# Patient Record
Sex: MALE | Race: WHITE | Employment: FULL TIME | URBAN - METROPOLITAN AREA
[De-identification: names, ages, dates, MRNs, and addresses within clinical notes are randomized per-mention and may not be internally consistent; named-entity substitution may affect disease eponyms.]

---

## 2018-10-09 ENCOUNTER — EVALUATION (OUTPATIENT)
Dept: PHYSICAL THERAPY | Age: 28
End: 2018-10-09
Payer: COMMERCIAL

## 2018-10-09 DIAGNOSIS — M54.50 ACUTE BILATERAL LOW BACK PAIN WITHOUT SCIATICA: Primary | ICD-10-CM

## 2018-10-09 PROCEDURE — G8991 OTHER PT/OT GOAL STATUS: HCPCS | Performed by: PHYSICAL THERAPIST

## 2018-10-09 PROCEDURE — G8990 OTHER PT/OT CURRENT STATUS: HCPCS | Performed by: PHYSICAL THERAPIST

## 2018-10-09 PROCEDURE — 97110 THERAPEUTIC EXERCISES: CPT | Performed by: PHYSICAL THERAPIST

## 2018-10-09 PROCEDURE — 97161 PT EVAL LOW COMPLEX 20 MIN: CPT | Performed by: PHYSICAL THERAPIST

## 2018-10-09 NOTE — LETTER
2018    Roverto Hooper MD  [facility]    Patient: Rea Medrano   YOB: 1990   Date of Visit: 10/9/2018     Encounter Diagnosis     ICD-10-CM    1  Acute bilateral low back pain without sciatica M54 5        Dear Dr Cynthia Wen:    Please review the attached Plan of Care from Eric 7 recent visit  Please verify that you agree therapy should continue by signing the attached document and sending it back to our office  If you have any questions or concerns, please don't hesitate to call  Sincerely,    Latoya Mckeon PT      Referring Provider:      I certify that I have read the below Plan of Care and certify the need for these services furnished under this plan of treatment while under my care  Roverto Hooper MD  [address]: [phone]          PT Evaluation     Today's date: 10/9/2018  Patient name: Rea Medrano  : 1990  MRN: [id number]  Referring provider: Ty Garrison MD  Dx:   Encounter Diagnosis     ICD-10-CM    1  Acute bilateral low back pain without sciatica M54 5                   Assessment  Impairments: activity intolerance and pain with function    Symptom irritability: low  Assessment details: 24year old male patient reports to PT with acute low back pain  Patient primary impairment seems to be decreased motor coordination of core musculature as patient had positive prone instability test   Patient has no distal symptoms, and has full lumbar ROM, with slightly painful lumbar extension  Patient will benefit from skilled PT services to address current impairments and functional limitations to help patient return to his PLOF  Understanding of Dx/Px/POC: good   Prognosis: good    Goals  STG  1  Patient will be independent with completion of HEP throughout therapy  2   Patient will sit for at least 30 minutes without increase in symptoms in 3 weeks  LTG  1  Patient will drive for prolonged distances without increase in symptoms in 6 weeks  2  Patient will lift without increase in symptoms in 6 weeks  Plan  Patient would benefit from: skilled physical therapy  Planned therapy interventions: abdominal trunk stabilization, activity modification, joint mobilization, manual therapy, neuromuscular re-education, patient education, strengthening, stretching, therapeutic activities, therapeutic exercise and home exercise program  Frequency: 2x week  Duration in weeks: 4  Treatment plan discussed with: patient        Subjective Evaluation    History of Present Illness  Mechanism of injury: Patient reports with acute intermittent low back pain that began about 3 months ago when patient picked up something heavy  Patient denies numbness/tingling, saddle anesthesia, progressive weakness, or bowel/bladder issues  Patient has some trouble sitting for prolonged periods, and lifting  Pain  Current pain ratin  At best pain ratin  At worst pain ratin  Quality: dull ache  Relieving factors: change in position  Aggravating factors: lifting and sitting    Treatments  Current treatment: physical therapy  Patient Goals  Patient goals for therapy: return to sport/leisure activities and decreased pain          Objective     Special Questions  Positive for disturbed sleep  Negative for night pain, bladder dysfunction, bowel dysfunction and saddle (S4) numbness    Active Range of Motion     Lumbar   Flexion: WFL  Extension: WFL and with pain    Tests     Lumbar   Positive prone instability   Left Hip   Modified Surendra: Positive  Right Hip   Modified Surendra: Positive         Flowsheet Rows      Most Recent Value   PT/OT G-Codes   Current Score  59   Projected Score  75   FOTO information reviewed  Yes   Assessment Type  Evaluation   G code set  Other PT/OT Primary   Other PT Primary Current Status ()  CK   Other PT Primary Goal Status ()  CJ          Precautions: none    Daily Treatment Diary     Manual                                                                                   Exercise Diary              Slouch overcorrect             bridges             Side planks             Supine hip flexor stretch                          Core strengthening                                                                                                                                                                                                       Modalities

## 2018-10-16 ENCOUNTER — OFFICE VISIT (OUTPATIENT)
Dept: PHYSICAL THERAPY | Age: 28
End: 2018-10-16
Payer: COMMERCIAL

## 2018-10-16 DIAGNOSIS — M54.50 ACUTE BILATERAL LOW BACK PAIN WITHOUT SCIATICA: Primary | ICD-10-CM

## 2018-10-16 NOTE — PROGRESS NOTES
PT Discharge Note     Today's date: 10/16/2018  Patient name: David Rogel  : 1990  MRN: [id number]  Referring provider: Gustavo Aguilar MD  Dx:   Encounter Diagnosis     ICD-10-CM    1  Acute bilateral low back pain without sciatica M54 5                   Subjective: Patient notes no problem with his back and did heavy lifting without any issues  Objective: See treatment diary below    Precautions: none     Daily Treatment Diary      Manual                                                                                                                                                      Exercise Diary   10/16                     Slouch overcorrect                       bridges  3x10                     Side planks  5x5" holds                     Supine hip flexor stretch                                               Core strengthening                                                                                                                                                                                                                                                                                                                                                                             Modalities                                                                                                      Assessment: Tolerated treatment well  Patient has met all functional goals and shows ability to self manage outside of PT  Patient educated to continue to have good body mechanics and keep up with HEP to help prevent symptoms from recurring  Goals  STG  1  Patient will be independent with completion of HEP throughout therapy - MET  2  Patient will sit for at least 30 minutes without increase in symptoms in 3 weeks  - MET  LTG  1  Patient will drive for prolonged distances without increase in symptoms in 6 weeks  - MET  2  Patient will lift without increase in symptoms in 6 weeks   - MET    Plan: Discharge

## 2020-04-02 NOTE — PROGRESS NOTES
PT Evaluation     Today's date: 10/9/2018  Patient name: Dion Wells  : 1990  MRN: [id number]  Referring provider: Minda Rod MD  Dx:   Encounter Diagnosis     ICD-10-CM    1  Acute bilateral low back pain without sciatica M54 5                   Assessment  Impairments: activity intolerance and pain with function    Symptom irritability: low  Assessment details: 24year old male patient reports to PT with acute low back pain  Patient primary impairment seems to be decreased motor coordination of core musculature as patient had positive prone instability test   Patient has no distal symptoms, and has full lumbar ROM, with slightly painful lumbar extension  Patient will benefit from skilled PT services to address current impairments and functional limitations to help patient return to his PLOF  Understanding of Dx/Px/POC: good   Prognosis: good    Goals  STG  1  Patient will be independent with completion of HEP throughout therapy  2  Patient will sit for at least 30 minutes without increase in symptoms in 3 weeks  LTG  1  Patient will drive for prolonged distances without increase in symptoms in 6 weeks  2  Patient will lift without increase in symptoms in 6 weeks  Plan  Patient would benefit from: skilled physical therapy  Planned therapy interventions: abdominal trunk stabilization, activity modification, joint mobilization, manual therapy, neuromuscular re-education, patient education, strengthening, stretching, therapeutic activities, therapeutic exercise and home exercise program  Frequency: 2x week  Duration in weeks: 4  Treatment plan discussed with: patient        Subjective Evaluation    History of Present Illness  Mechanism of injury: Patient reports with acute intermittent low back pain that began about 3 months ago when patient picked up something heavy  Patient denies numbness/tingling, saddle anesthesia, progressive weakness, or bowel/bladder issues    Patient has some trouble sitting for prolonged periods, and lifting  Pain  Current pain ratin  At best pain ratin  At worst pain ratin  Quality: dull ache  Relieving factors: change in position  Aggravating factors: lifting and sitting    Treatments  Current treatment: physical therapy  Patient Goals  Patient goals for therapy: return to sport/leisure activities and decreased pain          Objective     Special Questions  Positive for disturbed sleep  Negative for night pain, bladder dysfunction, bowel dysfunction and saddle (S4) numbness    Active Range of Motion     Lumbar   Flexion: WFL  Extension: WFL and with pain    Tests     Lumbar   Positive prone instability   Left Hip   Modified Surendra: Positive  Right Hip   Modified Surendra: Positive         Flowsheet Rows      Most Recent Value   PT/OT G-Codes   Current Score  59   Projected Score  75   FOTO information reviewed  Yes   Assessment Type  Evaluation   G code set  Other PT/OT Primary   Other PT Primary Current Status ()  CK   Other PT Primary Goal Status ()  CJ          Precautions: none    Daily Treatment Diary     Manual                                                                                   Exercise Diary              Slouch overcorrect             bridges             Side planks             Supine hip flexor stretch                          Core strengthening                                                                                                                                                                                                       Modalities No

## 2020-05-16 ENCOUNTER — HOSPITAL ENCOUNTER (EMERGENCY)
Facility: HOSPITAL | Age: 30
Discharge: HOME | End: 2020-05-16
Attending: EMERGENCY MEDICINE | Admitting: EMERGENCY MEDICINE
Payer: COMMERCIAL

## 2020-05-16 VITALS
RESPIRATION RATE: 16 BRPM | TEMPERATURE: 98.1 F | HEART RATE: 85 BPM | OXYGEN SATURATION: 99 % | DIASTOLIC BLOOD PRESSURE: 72 MMHG | WEIGHT: 137.4 LBS | SYSTOLIC BLOOD PRESSURE: 108 MMHG

## 2020-05-16 DIAGNOSIS — S61.219A FINGER LACERATION: Primary | ICD-10-CM

## 2020-05-16 PROCEDURE — 90715 TDAP VACCINE 7 YRS/> IM: CPT | Performed by: EMERGENCY MEDICINE

## 2020-05-16 PROCEDURE — 99282 EMERGENCY DEPT VISIT SF MDM: CPT | Performed by: EMERGENCY MEDICINE

## 2020-05-16 PROCEDURE — 99282 EMERGENCY DEPT VISIT SF MDM: CPT

## 2020-05-16 PROCEDURE — 90471 IMMUNIZATION ADMIN: CPT

## 2020-05-16 RX ADMIN — TETANUS TOXOID, REDUCED DIPHTHERIA TOXOID AND ACELLULAR PERTUSSIS VACCINE, ADSORBED 0.5 ML: 5; 2.5; 8; 8; 2.5 SUSPENSION INTRAMUSCULAR at 17:56

## 2021-06-17 ENCOUNTER — HOSPITAL ENCOUNTER (EMERGENCY)
Facility: HOSPITAL | Age: 31
Discharge: HOME | End: 2021-06-17
Attending: EMERGENCY MEDICINE | Admitting: EMERGENCY MEDICINE
Payer: COMMERCIAL

## 2021-06-17 ENCOUNTER — APPOINTMENT (EMERGENCY)
Dept: RADIOLOGY | Facility: HOSPITAL | Age: 31
End: 2021-06-17
Payer: COMMERCIAL

## 2021-06-17 VITALS
WEIGHT: 140 LBS | RESPIRATION RATE: 18 BRPM | DIASTOLIC BLOOD PRESSURE: 75 MMHG | HEART RATE: 53 BPM | OXYGEN SATURATION: 100 % | SYSTOLIC BLOOD PRESSURE: 129 MMHG | TEMPERATURE: 97.2 F

## 2021-06-17 DIAGNOSIS — N13.30 HYDRONEPHROSIS OF LEFT KIDNEY: Primary | ICD-10-CM

## 2021-06-17 DIAGNOSIS — N20.1 URETEROLITHIASIS: ICD-10-CM

## 2021-06-17 LAB
ALBUMIN SERPL BCP-MCNC: 4.8 G/DL (ref 3.5–5)
ALP SERPL-CCNC: 58 U/L (ref 46–116)
ALT SERPL W P-5'-P-CCNC: 28 U/L (ref 12–78)
ANION GAP SERPL CALCULATED.3IONS-SCNC: 10 MMOL/L (ref 4–13)
AST SERPL W P-5'-P-CCNC: 14 U/L (ref 5–45)
BACTERIA UR QL AUTO: ABNORMAL /HPF
BASOPHILS # BLD AUTO: 0.03 THOUSANDS/ΜL (ref 0–0.1)
BASOPHILS NFR BLD AUTO: 0 % (ref 0–1)
BILIRUB SERPL-MCNC: 0.74 MG/DL (ref 0.2–1)
BILIRUB UR QL STRIP: NEGATIVE
BUN SERPL-MCNC: 31 MG/DL (ref 5–25)
CALCIUM SERPL-MCNC: 9.7 MG/DL (ref 8.3–10.1)
CHLORIDE SERPL-SCNC: 109 MMOL/L (ref 100–108)
CLARITY UR: ABNORMAL
CO2 SERPL-SCNC: 25 MMOL/L (ref 21–32)
COARSE GRAN CASTS URNS QL MICRO: ABNORMAL /LPF
COLOR UR: ABNORMAL
CREAT SERPL-MCNC: 1.11 MG/DL (ref 0.6–1.3)
EOSINOPHIL # BLD AUTO: 0 THOUSAND/ΜL (ref 0–0.61)
EOSINOPHIL NFR BLD AUTO: 0 % (ref 0–6)
ERYTHROCYTE [DISTWIDTH] IN BLOOD BY AUTOMATED COUNT: 12.6 % (ref 11.6–15.1)
GFR SERPL CREATININE-BSD FRML MDRD: 90 ML/MIN/1.73SQ M
GLUCOSE SERPL-MCNC: 115 MG/DL (ref 65–140)
GLUCOSE UR STRIP-MCNC: NEGATIVE MG/DL
HCT VFR BLD AUTO: 42.7 % (ref 36.5–49.3)
HGB BLD-MCNC: 14.8 G/DL (ref 12–17)
HGB UR QL STRIP.AUTO: ABNORMAL
HYALINE CASTS #/AREA URNS LPF: ABNORMAL /LPF
IMM GRANULOCYTES # BLD AUTO: 0.04 THOUSAND/UL (ref 0–0.2)
IMM GRANULOCYTES NFR BLD AUTO: 0 % (ref 0–2)
KETONES UR STRIP-MCNC: ABNORMAL MG/DL
LEUKOCYTE ESTERASE UR QL STRIP: NEGATIVE
LYMPHOCYTES # BLD AUTO: 2.57 THOUSANDS/ΜL (ref 0.6–4.47)
LYMPHOCYTES NFR BLD AUTO: 25 % (ref 14–44)
MCH RBC QN AUTO: 29.2 PG (ref 26.8–34.3)
MCHC RBC AUTO-ENTMCNC: 34.7 G/DL (ref 31.4–37.4)
MCV RBC AUTO: 84 FL (ref 82–98)
MONOCYTES # BLD AUTO: 0.4 THOUSAND/ΜL (ref 0.17–1.22)
MONOCYTES NFR BLD AUTO: 4 % (ref 4–12)
MUCOUS THREADS UR QL AUTO: ABNORMAL
NEUTROPHILS # BLD AUTO: 7.16 THOUSANDS/ΜL (ref 1.85–7.62)
NEUTS SEG NFR BLD AUTO: 71 % (ref 43–75)
NITRITE UR QL STRIP: NEGATIVE
NON-SQ EPI CELLS URNS QL MICRO: ABNORMAL /HPF
NRBC BLD AUTO-RTO: 0 /100 WBCS
PH UR STRIP.AUTO: 8 [PH] (ref 4.5–8)
PLATELET # BLD AUTO: 311 THOUSANDS/UL (ref 149–390)
PMV BLD AUTO: 10.2 FL (ref 8.9–12.7)
POTASSIUM SERPL-SCNC: 3.9 MMOL/L (ref 3.5–5.3)
PROT SERPL-MCNC: 7.3 G/DL (ref 6.4–8.2)
PROT UR STRIP-MCNC: ABNORMAL MG/DL
RBC # BLD AUTO: 5.06 MILLION/UL (ref 3.88–5.62)
RBC #/AREA URNS AUTO: ABNORMAL /HPF
SODIUM SERPL-SCNC: 144 MMOL/L (ref 136–145)
SP GR UR STRIP.AUTO: 1.02 (ref 1–1.03)
UROBILINOGEN UR QL STRIP.AUTO: 1 E.U./DL
WBC # BLD AUTO: 10.2 THOUSAND/UL (ref 4.31–10.16)
WBC #/AREA URNS AUTO: ABNORMAL /HPF

## 2021-06-17 PROCEDURE — 36415 COLL VENOUS BLD VENIPUNCTURE: CPT | Performed by: EMERGENCY MEDICINE

## 2021-06-17 PROCEDURE — 99284 EMERGENCY DEPT VISIT MOD MDM: CPT | Performed by: EMERGENCY MEDICINE

## 2021-06-17 PROCEDURE — 96374 THER/PROPH/DIAG INJ IV PUSH: CPT

## 2021-06-17 PROCEDURE — 80053 COMPREHEN METABOLIC PANEL: CPT | Performed by: EMERGENCY MEDICINE

## 2021-06-17 PROCEDURE — G1004 CDSM NDSC: HCPCS

## 2021-06-17 PROCEDURE — 85025 COMPLETE CBC W/AUTO DIFF WBC: CPT | Performed by: EMERGENCY MEDICINE

## 2021-06-17 PROCEDURE — 74176 CT ABD & PELVIS W/O CONTRAST: CPT

## 2021-06-17 PROCEDURE — 81001 URINALYSIS AUTO W/SCOPE: CPT

## 2021-06-17 PROCEDURE — 96361 HYDRATE IV INFUSION ADD-ON: CPT

## 2021-06-17 PROCEDURE — 96375 TX/PRO/DX INJ NEW DRUG ADDON: CPT

## 2021-06-17 PROCEDURE — 99284 EMERGENCY DEPT VISIT MOD MDM: CPT

## 2021-06-17 RX ORDER — OXYCODONE HYDROCHLORIDE AND ACETAMINOPHEN 5; 325 MG/1; MG/1
1 TABLET ORAL EVERY 4 HOURS PRN
Qty: 6 TABLET | Refills: 0 | Status: SHIPPED | OUTPATIENT
Start: 2021-06-17

## 2021-06-17 RX ORDER — HYDROMORPHONE HCL/PF 1 MG/ML
1 SYRINGE (ML) INJECTION ONCE
Status: DISCONTINUED | OUTPATIENT
Start: 2021-06-17 | End: 2021-06-17 | Stop reason: HOSPADM

## 2021-06-17 RX ORDER — KETOROLAC TROMETHAMINE 30 MG/ML
15 INJECTION, SOLUTION INTRAMUSCULAR; INTRAVENOUS ONCE
Status: COMPLETED | OUTPATIENT
Start: 2021-06-17 | End: 2021-06-17

## 2021-06-17 RX ORDER — ONDANSETRON 4 MG/1
4 TABLET, ORALLY DISINTEGRATING ORAL EVERY 6 HOURS PRN
Qty: 6 TABLET | Refills: 0 | Status: SHIPPED | OUTPATIENT
Start: 2021-06-17

## 2021-06-17 RX ORDER — ONDANSETRON 2 MG/ML
4 INJECTION INTRAMUSCULAR; INTRAVENOUS ONCE
Status: COMPLETED | OUTPATIENT
Start: 2021-06-17 | End: 2021-06-17

## 2021-06-17 RX ADMIN — ONDANSETRON 4 MG: 2 INJECTION INTRAMUSCULAR; INTRAVENOUS at 15:16

## 2021-06-17 RX ADMIN — KETOROLAC TROMETHAMINE 15 MG: 30 INJECTION, SOLUTION INTRAMUSCULAR at 15:16

## 2021-06-17 RX ADMIN — SODIUM CHLORIDE 1000 ML: 0.9 INJECTION, SOLUTION INTRAVENOUS at 15:18

## 2021-06-17 NOTE — ED PROVIDER NOTES
History  Chief Complaint   Patient presents with    Abdominal Pain     LLQ abd  pain with sudden onset     HPI   27-year-old gentleman here with left flank/left lower quadrant pain  He was going to the bathroom this morning when he developed sudden onset stabbing pain that he 1st felt mostly in the left lower quadrant  It initially got better and then 45 minutes prior to arrival became acutely more severe  Pain is in the left flank and radiates to the left lower quadrant  Prior to arrival he was on floor writhing in pain and was diaphoretic  He has felt nauseated and has vomited twice  No history of nephrolithiasis or gastrointestinal problems or abdominal surgeries  He has not been febrile  No urinary symptoms  No diarrhea or constipation  No testicular pain or swelling  None       History reviewed  No pertinent past medical history  History reviewed  No pertinent surgical history  History reviewed  No pertinent family history  I have reviewed and agree with the history as documented  E-Cigarette/Vaping     E-Cigarette/Vaping Substances     Social History     Tobacco Use    Smoking status: Never Smoker    Smokeless tobacco: Never Used   Substance Use Topics    Alcohol use: Yes    Drug use: Never        Review of Systems   Constitutional: Negative for chills and fever  Respiratory: Negative for shortness of breath  Cardiovascular: Negative for chest pain  Gastrointestinal: Positive for abdominal pain, nausea and vomiting  Negative for constipation and diarrhea  Genitourinary: Positive for flank pain  Negative for frequency, hematuria, scrotal swelling and testicular pain  All other systems reviewed and are negative        Physical Exam  ED Triage Vitals [06/17/21 1455]   Temperature Pulse Respirations Blood Pressure SpO2   (!) 97 2 °F (36 2 °C) (!) 53 18 129/75 100 %      Temp Source Heart Rate Source Patient Position - Orthostatic VS BP Location FiO2 (%)   Tympanic -- -- -- -- Pain Score       Worst Possible Pain             Orthostatic Vital Signs  Vitals:    06/17/21 1455   BP: 129/75   Pulse: (!) 53       Physical Exam  Vitals and nursing note reviewed  Constitutional:       General: He is not in acute distress  Appearance: He is well-developed  He is not diaphoretic  Comments: Uncomfortable appearing  Writhing around on stretcher  HENT:      Head: Normocephalic and atraumatic  Eyes:      General: No scleral icterus  Conjunctiva/sclera: Conjunctivae normal       Pupils: Pupils are equal, round, and reactive to light  Cardiovascular:      Rate and Rhythm: Normal rate and regular rhythm  Heart sounds: No murmur heard  No friction rub  No gallop  Pulmonary:      Breath sounds: Normal breath sounds  No wheezing or rales  Abdominal:      General: There is no distension  Palpations: Abdomen is soft  Tenderness: There is no abdominal tenderness  There is no guarding or rebound  Genitourinary:     Comments: Mild tenderness to palpation over left CVA  Musculoskeletal:         General: No tenderness  Normal range of motion  Cervical back: Normal range of motion and neck supple  Skin:     General: Skin is warm and dry  Coloration: Skin is not pale  Findings: No erythema  Neurological:      Mental Status: He is alert and oriented to person, place, and time  Cranial Nerves: No cranial nerve deficit  Sensory: No sensory deficit  Motor: No abnormal muscle tone     Psychiatric:         Behavior: Behavior normal          ED Medications  Medications   ketorolac (TORADOL) injection 15 mg (15 mg Intravenous Given 6/17/21 1516)   ondansetron (ZOFRAN) injection 4 mg (4 mg Intravenous Given 6/17/21 1516)   sodium chloride 0 9 % bolus 1,000 mL (0 mL Intravenous Stopped 6/17/21 1719)       Diagnostic Studies  Results Reviewed     Procedure Component Value Units Date/Time    Urine Microscopic [[id number]]  (Abnormal) Collected: 06/17/21 1528    Lab Status: Final result Specimen: Urine, Clean Catch Updated: 06/17/21 1616     RBC, UA Innumerable /hpf      WBC, UA 2-4 /hpf      Epithelial Cells None Seen /hpf      Bacteria, UA None Seen /hpf      Hyaline Casts, UA 0-3 /lpf      COARSE GRANULAR CASTS 0-3 /lpf      MUCUS THREADS Occasional    Comprehensive metabolic panel [[id number]]  (Abnormal) Collected: 06/17/21 1519    Lab Status: Final result Specimen: Blood from Arm, Left Updated: 06/17/21 1547     Sodium 144 mmol/L      Potassium 3 9 mmol/L      Chloride 109 mmol/L      CO2 25 mmol/L      ANION GAP 10 mmol/L      BUN 31 mg/dL      Creatinine 1 11 mg/dL      Glucose 115 mg/dL      Calcium 9 7 mg/dL      AST 14 U/L      ALT 28 U/L      Alkaline Phosphatase 58 U/L      Total Protein 7 3 g/dL      Albumin 4 8 g/dL      Total Bilirubin 0 74 mg/dL      eGFR 90 ml/min/1 73sq m     Narrative:      National Kidney Disease Foundation guidelines for Chronic Kidney Disease (CKD):     Stage 1 with normal or high GFR (GFR > 90 mL/min/1 73 square meters)    Stage 2 Mild CKD (GFR = 60-89 mL/min/1 73 square meters)    Stage 3A Moderate CKD (GFR = 45-59 mL/min/1 73 square meters)    Stage 3B Moderate CKD (GFR = 30-44 mL/min/1 73 square meters)    Stage 4 Severe CKD (GFR = 15-29 mL/min/1 73 square meters)    Stage 5 End Stage CKD (GFR <15 mL/min/1 73 square meters)  Note: GFR calculation is accurate only with a steady state creatinine    CBC and differential [[id number]]  (Abnormal) Collected: 06/17/21 1519    Lab Status: Final result Specimen: Blood from Arm, Left Updated: 06/17/21 1536     WBC 10 20 Thousand/uL      RBC 5 06 Million/uL      Hemoglobin 14 8 g/dL      Hematocrit 42 7 %      MCV 84 fL      MCH 29 2 pg      MCHC 34 7 g/dL      RDW 12 6 %      MPV 10 2 fL      Platelets 331 Thousands/uL      nRBC 0 /100 WBCs      Neutrophils Relative 71 %      Immat GRANS % 0 %      Lymphocytes Relative 25 %      Monocytes Relative 4 %      Eosinophils Relative 0 %      Basophils Relative 0 %      Neutrophils Absolute 7 16 Thousands/µL      Immature Grans Absolute 0 04 Thousand/uL      Lymphocytes Absolute 2 57 Thousands/µL      Monocytes Absolute 0 40 Thousand/µL      Eosinophils Absolute 0 00 Thousand/µL      Basophils Absolute 0 03 Thousands/µL     Urine Macroscopic, POC [[id number]]  (Abnormal) Collected: 06/17/21 1528    Lab Status: Final result Specimen: Urine Updated: 06/17/21 1529     Color, UA Jessica     Clarity, UA Cloudy     pH, UA 8 0     Leukocytes, UA Negative     Nitrite, UA Negative     Protein, UA 30 (1+) mg/dl      Glucose, UA Negative mg/dl      Ketones, UA >=160 (4+) mg/dl      Urobilinogen, UA 1 0 E U /dl      Bilirubin, UA Negative     Blood, UA Large     Specific Gravity, UA 1 025    Narrative:      CLINITEK RESULT                 CT renal stone study abdomen pelvis wo contrast   Final Result by Funmilayo Kwok DO (06/17 1642)      3 mm left UVJ calculus causing mild hydroureteronephrosis  2 mm left renal calculus        Workstation performed: [id number]               Procedures  Procedures      ED Course  ED Course as of Jun 17 1946   Thu Jun 17, 2021   1532 Ketones, UA(!): >=160 (4+)   1532 Blood, UA(!): Large   1603 eGFR: 90                                       MDM  Number of Diagnoses or Management Options  Hydronephrosis of left kidney: new and requires workup  Ureterolithiasis: new and requires workup     Amount and/or Complexity of Data Reviewed  Clinical lab tests: ordered and reviewed  Tests in the radiology section of CPT®: ordered and reviewed  Tests in the medicine section of CPT®: ordered and reviewed  Decide to obtain previous medical records or to obtain history from someone other than the patient: yes  Review and summarize past medical records: yes  Independent visualization of images, tracings, or specimens: yes    Patient Progress  Patient progress: improved     31year-old here with left flank and left lower quadrant abd pain   On arrival patient uncomfortable and requires IV analgesia  Likely nephrolithiasis given writhing type pain radiating from left flank to left groin area  Given patient has no history of nephrolithiasis plan is CT stone study to evaluate for hydronephrosis or ureterolithiasis  Check renal function and check urinalysis for evidence of hematuria or urinary tract infection  3 mm L UVJ stone  Anticipate spontaneous passage  Pain improved  Will provide Rx for brief course of PRN pain medicine + ibuprofen  Follow-up with urology  Disposition  Final diagnoses:   Hydronephrosis of left kidney   Ureterolithiasis - Left     Time reflects when diagnosis was documented in both MDM as applicable and the Disposition within this note     Time User Action Codes Description Comment    6/17/2021  4:48 PM Stormy Sprain Add [N13 30] Hydronephrosis of left kidney     6/17/2021  4:49 PM Stormy Sprain Add [N20 1] Ureterolithiasis     6/17/2021  4:49 PM Stormy Sprain Modify [N20 1] Ureterolithiasis Left      ED Disposition     ED Disposition Condition Date/Time Comment    Discharge Good Thu Jun 17, 2021  4:48 PM Mir Jones Check discharge to home/self care              Follow-up Information     Follow up With Specialties Details Why Contact Info Additional [address] [facility] Schedule an appointment as soon as possible for a visit   [id number] [facility] [address] [address]  701  [facility], [address], [address] [place] [address], [address] [facility]          Discharge Medication List as of 6/17/2021  4:56 PM      START taking these medications    Details   ondansetron (ZOFRAN-ODT) 4 mg disintegrating tablet Take 1 tablet (4 mg total) by mouth every 6 (six) hours as needed for nausea or vomiting, Starting Thu 6/17/2021, Normal      oxyCODONE-acetaminophen (PERCOCET) 5-325 mg per tablet Take 1 tablet by mouth every 4 (four) hours as needed for moderate painMax Daily Amount: 6 tablets, Starting Thu 6/17/2021, Normal           No discharge procedures on file  PDMP Review     None           ED Provider  Attending physically available and evaluated Rosalinesara Abdiasgloria Sol  I managed the patient along with the ED Attending      Electronically Signed by         Emir Mercado MD  06/17/21 4196

## 2021-06-17 NOTE — ED ATTENDING ATTESTATION
6/17/2021  Yohannes CESAR MD, saw and evaluated the patient  I have discussed the patient with the resident/non-physician practitioner and agree with the resident's/non-physician practitioner's findings, Plan of Care, and MDM as documented in the resident's/non-physician practitioner's note, except where noted  All available labs and Radiology studies were reviewed  I was present for key portions of any procedure(s) performed by the resident/non-physician practitioner and I was immediately available to provide assistance  At this point I agree with the current assessment done in the Emergency Department  I have conducted an independent evaluation of this patient a history and physical is as follows:    ED Course         Critical Care Time  Procedures      32 yo male with left lower abdominal pain started earlier today and then progressed one hour ago  Pt with two episodes of vomiting due to pain  Pt with pressure with urinating, no pain or blood  Pt with no diarrhea, no fever, no back pain  No hx of same  No pmh  Vss, afebrile, lungs cta, rrr, abdomen soft mild left lq tenderness, no cva tenderness  Stone study, pain meds, urine

## 2021-06-18 ENCOUNTER — HOSPITAL ENCOUNTER (EMERGENCY)
Facility: HOSPITAL | Age: 31
Discharge: HOME | End: 2021-06-19
Payer: COMMERCIAL

## 2021-06-18 VITALS
OXYGEN SATURATION: 99 % | HEIGHT: 70 IN | WEIGHT: 134 LBS | BODY MASS INDEX: 19.18 KG/M2 | DIASTOLIC BLOOD PRESSURE: 72 MMHG | RESPIRATION RATE: 18 BRPM | SYSTOLIC BLOOD PRESSURE: 142 MMHG | TEMPERATURE: 99.1 F | HEART RATE: 62 BPM

## 2021-06-18 DIAGNOSIS — N20.1 URETEROLITHIASIS: Primary | ICD-10-CM

## 2021-06-18 DIAGNOSIS — R10.9 LEFT FLANK PAIN: ICD-10-CM

## 2021-06-18 PROCEDURE — 96372 THER/PROPH/DIAG INJ SC/IM: CPT

## 2021-06-18 PROCEDURE — 99284 EMERGENCY DEPT VISIT MOD MDM: CPT | Performed by: EMERGENCY MEDICINE

## 2021-06-18 PROCEDURE — 99284 EMERGENCY DEPT VISIT MOD MDM: CPT

## 2021-06-18 PROCEDURE — 76775 US EXAM ABDO BACK WALL LIM: CPT | Performed by: EMERGENCY MEDICINE

## 2021-06-18 PROCEDURE — 81001 URINALYSIS AUTO W/SCOPE: CPT | Performed by: EMERGENCY MEDICINE

## 2021-06-18 RX ORDER — KETOROLAC TROMETHAMINE 30 MG/ML
15 INJECTION, SOLUTION INTRAMUSCULAR; INTRAVENOUS ONCE
Status: COMPLETED | OUTPATIENT
Start: 2021-06-18 | End: 2021-06-18

## 2021-06-18 RX ADMIN — KETOROLAC TROMETHAMINE 15 MG: 30 INJECTION, SOLUTION INTRAMUSCULAR; INTRAVENOUS at 23:30

## 2021-06-19 LAB
BACTERIA UR QL AUTO: ABNORMAL /HPF
BILIRUB UR QL STRIP: NEGATIVE
CLARITY UR: ABNORMAL
COLOR UR: YELLOW
GLUCOSE UR STRIP-MCNC: NEGATIVE MG/DL
HGB UR QL STRIP.AUTO: ABNORMAL
HYALINE CASTS #/AREA URNS LPF: ABNORMAL /LPF
KETONES UR STRIP-MCNC: NEGATIVE MG/DL
LEUKOCYTE ESTERASE UR QL STRIP: NEGATIVE
NITRITE UR QL STRIP: NEGATIVE
NON-SQ EPI CELLS URNS QL MICRO: ABNORMAL /HPF
PH UR STRIP.AUTO: 6 [PH]
PROT UR STRIP-MCNC: NEGATIVE MG/DL
RBC #/AREA URNS AUTO: ABNORMAL /HPF
SP GR UR STRIP.AUTO: 1.02 (ref 1–1.03)
UROBILINOGEN UR QL STRIP.AUTO: 1 E.U./DL
WBC #/AREA URNS AUTO: ABNORMAL /HPF

## 2021-06-19 NOTE — DISCHARGE INSTRUCTIONS
Please return to the emergency department for any new or worsening symptoms including fevers, chills, worsening pain, or pain with urination

## 2021-06-19 NOTE — ED PROVIDER NOTES
History  Chief Complaint   Patient presents with    Abdominal Pain     patient states he was dx with kidney stone yesterday and still having pain     Patient is a 31year old male who presents for evaluation of left flank pain  Patient was seen in the ED yesterday and was diagnosed with left sided ureterolithiasis with mild hydroureteronephrosis  Stone was 3mm and at the UVJ at that time  Patient states that earlier today, he had worsening pain in the left flank  Patient took his prescribed pain medications, but these did not seem to help  Endorses nausea associated with the pain, but no emesis  Patient states that the pain has since subsided, but he was concerned because he does not believe that he passed the stone yet  Patient denies any fevers, chills, chest pain, SOB, dysuria, or hematuria  History provided by:  Patient   used: No    Abdominal Pain  Pain location:  L flank  Pain radiates to:  LLQ and groin  Pain severity:  Moderate  Duration:  2 days  Timing:  Intermittent  Progression:  Waxing and waning  Ineffective treatments:  NSAIDs and position changes (prescription medications)  Associated symptoms: nausea    Associated symptoms: no chest pain, no chills, no dysuria, no fever, no hematuria and no vomiting        Prior to Admission Medications   Prescriptions Last Dose Informant Patient Reported? Taking?   ondansetron (ZOFRAN-ODT) 4 mg disintegrating tablet   No No   Sig: Take 1 tablet (4 mg total) by mouth every 6 (six) hours as needed for nausea or vomiting   oxyCODONE-acetaminophen (PERCOCET) 5-325 mg per tablet   No No   Sig: Take 1 tablet by mouth every 4 (four) hours as needed for moderate painMax Daily Amount: 6 tablets      Facility-Administered Medications: None       No past medical history on file  No past surgical history on file  No family history on file  I have reviewed and agree with the history as documented      E-Cigarette/Vaping     E-Cigarette/Vaping Substances     Social History     Tobacco Use    Smoking status: Never Smoker    Smokeless tobacco: Never Used   Substance Use Topics    Alcohol use: Yes    Drug use: Never        Review of Systems   Constitutional: Negative for chills and fever  HENT: Negative  Cardiovascular: Negative for chest pain  Gastrointestinal: Positive for abdominal pain and nausea  Negative for vomiting  Genitourinary: Negative for dysuria and hematuria  Musculoskeletal: Negative  Skin: Negative  Neurological: Negative  All other systems reviewed and are negative  Physical Exam  ED Triage Vitals [06/18/21 2305]   Temperature Pulse Respirations Blood Pressure SpO2   99 1 °F (37 3 °C) 62 18 142/72 99 %      Temp Source Heart Rate Source Patient Position - Orthostatic VS BP Location FiO2 (%)   Oral Monitor Lying Right arm --      Pain Score       6             Orthostatic Vital Signs  Vitals:    06/18/21 2305   BP: 142/72   Pulse: 62   Patient Position - Orthostatic VS: Lying       Physical Exam  Vitals and nursing note reviewed  Constitutional:       General: He is awake  He is not in acute distress  Appearance: He is not ill-appearing or diaphoretic  Eyes:      General: Vision grossly intact  Gaze aligned appropriately  Cardiovascular:      Rate and Rhythm: Normal rate and regular rhythm  Heart sounds: S1 normal and S2 normal    Pulmonary:      Effort: Pulmonary effort is normal  No respiratory distress  Breath sounds: Normal breath sounds  No wheezing, rhonchi or rales  Abdominal:      Palpations: Abdomen is soft  Tenderness: There is no abdominal tenderness  There is no right CVA tenderness, left CVA tenderness or guarding  Skin:     General: Skin is warm and dry  Neurological:      General: No focal deficit present  Mental Status: He is alert and oriented to person, place, and time           ED Medications  Medications   ketorolac (TORADOL) injection 15 mg (15 mg Intramuscular Given 6/18/21 2330)       Diagnostic Studies  Results Reviewed     Procedure Component Value Units Date/Time    Urine Microscopic [[id number]]  (Abnormal) Collected: 06/18/21 2348    Lab Status: Final result Specimen: Urine, Clean Catch Updated: 06/19/21 0003     RBC, UA Innumerable /hpf      WBC, UA 2-4 /hpf      Epithelial Cells None Seen /hpf      Bacteria, UA None Seen /hpf      Hyaline Casts, UA None Seen /lpf     UA w Reflex to Microscopic w Reflex to Culture [[id number]]  (Abnormal) Collected: 06/18/21 2348    Lab Status: Final result Specimen: Urine, Clean Catch Updated: 06/19/21 0002     Color, UA Yellow     Clarity, UA Cloudy     Specific [place], UA 1 019     pH, UA 6 0     Leukocytes, UA Negative     Nitrite, UA Negative     Protein, UA Negative mg/dl      Glucose, UA Negative mg/dl      Ketones, UA Negative mg/dl      Urobilinogen, UA 1 0 E U /dl      Bilirubin, UA Negative     Blood, UA Large                 No orders to display         Procedures  POC Renal US    Date/Time: 6/18/2021 11:42 PM  Performed by: Nazario Langley DO  Authorized by: Nazario Langley DO     Patient location:  ED  Performed by:  Resident  Other Assisting Provider: Yes (comment) (Dr Gelacio Avery)    Procedure details:     Exam Type:  Diagnostic and educational    Indications: flank/back pain      Assessment for:  Suspected hydronephrosis    Views obtained: left kidney and right kidney      Image quality: limited diagnostic      Image availability:  Images available in PACS  Findings:     LEFT hydronephrosis: mild (grade 1)      RIGHT hydronephrosis: none    Interpretation:     Renal ultrasound impressions comment:  Mild left sided hydronephrosis          ED Course                                       MDM  Number of Diagnoses or Management Options  Left flank pain: established and worsening  Ureterolithiasis: established and worsening  Diagnosis management comments: 31year old male diagnosed with left UVJ ureterolithiasis with left sided hydronephrosis returns for evaluation after an episode of worsened pain earlier today  Patient does not believe he has passed the stone yet  Pain subsided prior to arrival  On exam, patient is well appearing, abdomen soft and nontender, no CVA tenderness  Patient given IM toradol for pain relief  Given the fact that patient had a CT scan performed yesterday showing a stone at the UVJ with some hydronephrosis, decision was made to perform a bedside US to evaluate for hydronephrosis rather than exposing patient to further radiation  US showed very mild left sided hydronephrosis, does not appear to be worse than seen on CT scan yesterday  UA ordered to rule out infection  While giving a urine sample, patient passed the 3mm stone while in the department  Patient already has urology follow up  Pain well controlled at this time  Patient discharged in stable condition with strict ED return precautions  Amount and/or Complexity of Data Reviewed  Clinical lab tests: ordered and reviewed  Independent visualization of images, tracings, or specimens: yes    Patient Progress  Patient progress: stable      Disposition  Final diagnoses:   Ureterolithiasis   Left flank pain     Time reflects when diagnosis was documented in both MDM as applicable and the Disposition within this note     Time User Action Codes Description Comment    6/18/2021 11:56 PM Edd Curry Add [N20 1] Ureterolithiasis     6/18/2021 11:57 PM Edd Curry Add [R10 9] Left flank pain       ED Disposition     ED Disposition Condition Date/Time Comment    Discharge Stable Fri Jun 18, 2021 11:54 PM Shan Cure Check discharge to home/self care              Follow-up Information     Follow up With Specialties Details Why Contact Info Additional Information    Mary Rosenbaum DO Family Medicine Schedule an appointment as soon as possible for a visit in 1 week As needed [place] [address]  [phone]       [facility] Emergency Department Emergency Medicine Go to  If symptoms worsen [address]  [address] Emergency Department, [address], Lm, [address] [facility] 108          Discharge Medication List as of 6/18/2021 11:58 PM      CONTINUE these medications which have NOT CHANGED    Details   ondansetron (ZOFRAN-ODT) 4 mg disintegrating tablet Take 1 tablet (4 mg total) by mouth every 6 (six) hours as needed for nausea or vomiting, Starting Thu 6/17/2021, Normal      oxyCODONE-acetaminophen (PERCOCET) 5-325 mg per tablet Take 1 tablet by mouth every 4 (four) hours as needed for moderate painMax Daily Amount: 6 tablets, Starting Thu 6/17/2021, Normal           No discharge procedures on file  PDMP Review     None           ED Provider  Attending physically available and evaluated April Horton  I managed the patient along with the ED Attending      Electronically Signed by         [employer], DO  06/21/21 [facility], DO  06/21/21 2408

## 2021-06-24 ENCOUNTER — TELEPHONE (OUTPATIENT)
Dept: UROLOGY | Facility: MEDICAL CENTER | Age: 31
End: 2021-06-24

## 2021-06-24 NOTE — TELEPHONE ENCOUNTER
Complaint/Diagnosis:  F/u ed kidney stone  that passed     Insurance: [employer]    History of Cancer: no    Previous urologist:no    Outside testing/where: yes     If yes,what kind:labwork, us and ct     Records requested/where:    [place] location[place]

## 2021-06-24 NOTE — ED ATTENDING ATTESTATION
6/18/2021  IDina DO, saw and evaluated the patient  I have discussed the patient with the resident/non-physician practitioner and agree with the resident's/non-physician practitioner's findings, Plan of Care, and MDM as documented in the resident's/non-physician practitioner's note, except where noted  All available labs and Radiology studies were reviewed  I was present for key portions of any procedure(s) performed by the resident/non-physician practitioner and I was immediately available to provide assistance  At this point I agree with the current assessment done in the Emergency Department  I have conducted an independent evaluation of this patient a history and physical is as follows:     31 yom with known left UVJ stone presents for worsening pain  Bedside ultrasound shows no hydronephrosis patient asymptomatic right now    Likely passed stone the bladder supportive care    ED Course         Critical Care Time  Procedures

## 2021-06-24 NOTE — TELEPHONE ENCOUNTER
Called patient and scheduled him Monday with Angle Ramsey   Patient passed stone was in ED 6/16, 17 and 18

## 2021-06-28 ENCOUNTER — OFFICE VISIT (OUTPATIENT)
Dept: UROLOGY | Facility: AMBULATORY SURGERY CENTER | Age: 31
End: 2021-06-28
Payer: COMMERCIAL

## 2021-06-28 VITALS
BODY MASS INDEX: 20.76 KG/M2 | SYSTOLIC BLOOD PRESSURE: 130 MMHG | DIASTOLIC BLOOD PRESSURE: 82 MMHG | HEART RATE: 72 BPM | HEIGHT: 70 IN | WEIGHT: 145 LBS

## 2021-06-28 DIAGNOSIS — N20.0 NEPHROLITHIASIS: Primary | ICD-10-CM

## 2021-06-28 PROCEDURE — 82360 CALCULUS ASSAY QUANT: CPT | Performed by: NURSE PRACTITIONER

## 2021-06-28 PROCEDURE — 99204 OFFICE O/P NEW MOD 45 MIN: CPT | Performed by: NURSE PRACTITIONER

## 2021-06-28 NOTE — PROGRESS NOTES
6/28/2021    Jann Dials Check  1990  [de-identified]      Assessment  -Nephrolithiasis     Discussion/Plan  Fantasma Caraballo is a 31 y o  male who presents in consultation     1  Nephrolithiasis-   Patient presents today with stone specimen  We will send for stone analysis and call with results  Discussed dietary recommendations  He is currently asymptomatic  We will obtain renal ultrasound to ensure resolution of hydronephrosis  Patient requesting call with results  Will continue to monitor additional nonobstructing left renal calculus  Follow-up in 1 year with [facility], or sooner if needed  He was instructed to call with any issues     -All questions answered, patient agrees with plan      History of Present Illness  31 y o  male  Who presents in consultation today for evaluation of nephrolithiasis  He recently experienced an acute onset of  Left-sided flank pain and nausea  CT renal stone study had identified a 3 mm left UVJ calculus with mild hydronephrosis  He was also found to have an additional 2 mm nonobstructing left renal calculus  He denies any additional symptoms such as fever, chills, lower urinary tract symptoms, gross hematuria, or dysuria  WBC 10 2, creatinine 1 1, and urine appear negative for infection  Fortunately, patient spontaneously since passed stone 1 day after ER discharge  Patient states this was his 1st stone episode  He believes he was dehydrated  He denies any family history of kidney stones  Review of Systems  Review of Systems   Constitutional: Negative  HENT: Negative  Respiratory: Negative  Cardiovascular: Negative  Gastrointestinal: Negative  Genitourinary: Negative for decreased urine volume, difficulty urinating, dysuria, flank pain, frequency, hematuria and urgency  Musculoskeletal: Negative  Skin: Negative  Neurological: Negative  Psychiatric/Behavioral: Negative  Past Medical History  History reviewed   No pertinent past medical history  Past Social History  History reviewed  No pertinent surgical history  Past Family History  History reviewed  No pertinent family history  Past Social history  Social History     Socioeconomic History    Marital status: Single     Spouse name: Not on file    Number of children: Not on file    Years of education: Not on file    Highest education level: Not on file   Occupational History    Not on file   Tobacco Use    Smoking status: Former Smoker     Types: Cigarettes    Smokeless tobacco: Never Used   Vaping Use    Vaping Use: Never used   Substance and Sexual Activity    Alcohol use: Yes    Drug use: Never    Sexual activity: Not on file   Other Topics Concern    Not on file   Social History Narrative    Not on file     Social Determinants of Health     Financial Resource Strain:     Difficulty of Paying Living Expenses:    Food Insecurity:     Worried About Running Out of Food in the Last Year:     [address] in the Last Year:    Transportation Needs:     Lack of Transportation (Medical):      Lack of Transportation (Non-Medical):    Physical Activity:     Days of Exercise per Week:     Minutes of Exercise per Session:    Stress:     Feeling of Stress :    Social Connections:     Frequency of Communication with Friends and Family:     Frequency of Social Gatherings with Friends and Family:     Attends [employer] Services:     Active Member of Clubs or Organizations:     Attends Club or Organization Meetings:     Marital Status:    Intimate Partner Violence:     Fear of Current or Ex-Partner:     Emotionally Abused:     Physically Abused:     Sexually Abused:        Current Medications  Current Outpatient Medications   Medication Sig Dispense Refill    ondansetron (ZOFRAN-ODT) 4 mg disintegrating tablet Take 1 tablet (4 mg total) by mouth every 6 (six) hours as needed for nausea or vomiting (Patient not taking: Reported on 6/28/2021) 6 tablet 0    oxyCODONE-acetaminophen (PERCOCET) 5-325 mg per tablet Take 1 tablet by mouth every 4 (four) hours as needed for moderate painMax Daily Amount: 6 tablets (Patient not taking: Reported on 6/28/2021) 6 tablet 0     No current facility-administered medications for this visit  Allergies  No Known Allergies    Past Medical History, Social History, Family History, medications and allergies were reviewed  Vitals  Vitals:    06/28/21 1315   BP: 130/82   BP Location: Left arm   Patient Position: Sitting   Cuff Size: Adult   Pulse: 72   Weight: 65 8 kg (145 lb)   Height: 5' 10" (1 778 m)       Physical Exam  Physical Exam  Constitutional:       Appearance: Normal appearance  He is well-developed  HENT:      Head: Normocephalic  Eyes:      Pupils: Pupils are equal, round, and reactive to light  Pulmonary:      Effort: Pulmonary effort is normal    Abdominal:      Palpations: Abdomen is soft  Genitourinary:     Comments: Negative CVA tenderness  Musculoskeletal:         General: Normal range of motion  Cervical back: Normal range of motion  Skin:     General: Skin is warm and dry  Neurological:      General: No focal deficit present  Mental Status: He is alert and oriented to person, place, and time  Psychiatric:         Mood and Affect: Mood normal          Behavior: Behavior normal          Thought Content:  Thought content normal          Judgment: Judgment normal          Results    I have personally reviewed all pertinent lab results and reviewed with patient  No results found for: PSA  Lab Results   Component Value Date    GLUCOSE 92 05/20/2015    CALCIUM 9 7 06/17/2021     05/20/2015    K 3 9 06/17/2021    CO2 25 06/17/2021     (H) 06/17/2021    BUN 31 (H) 06/17/2021    CREATININE 1 11 06/17/2021     Lab Results   Component Value Date    WBC 10 20 (H) 06/17/2021    HGB 14 8 06/17/2021    HCT 42 7 06/17/2021    MCV 84 06/17/2021     06/17/2021     No results found for this or any previous visit (from the past 1 hour(s))

## 2021-07-09 LAB
CALCIUM OXALATE DIHYDRATE MFR STONE IR: 20 %
COLOR STONE: NORMAL
COM MFR STONE: 75 %
COMMENT-STONE3: NORMAL
COMPOSITION: NORMAL
HYDROXYAPATITE 24H ENGDIFF UR: 5 %
LABORATORY COMMENT REPORT: NORMAL
PHOTO: NORMAL
SIZE STONE: NORMAL MM
SPEC SOURCE SUBJ: NORMAL
STONE ANALYSIS-IMP: NORMAL
WT STONE: 17 MG

## 2021-08-06 ENCOUNTER — HOSPITAL ENCOUNTER (OUTPATIENT)
Dept: RADIOLOGY | Age: 31
Discharge: HOME | End: 2021-08-06
Payer: COMMERCIAL

## 2021-08-06 DIAGNOSIS — N20.0 NEPHROLITHIASIS: ICD-10-CM

## 2021-08-06 PROCEDURE — 76770 US EXAM ABDO BACK WALL COMP: CPT

## 2021-08-12 ENCOUNTER — TELEPHONE (OUTPATIENT)
Dept: UROLOGY | Facility: AMBULATORY SURGERY CENTER | Age: 31
End: 2021-08-12

## 2021-08-12 NOTE — TELEPHONE ENCOUNTER
Please inform patient results of renal ultrasound showed no evidence of hydronephrosis  Bilateral nonobstructing renal calculi were seen  Recommend follow-up in 1 year with [facility]

## 2021-08-17 ENCOUNTER — HOSPITAL ENCOUNTER (EMERGENCY)
Facility: HOSPITAL | Age: 31
Discharge: HOME | End: 2021-08-17
Attending: EMERGENCY MEDICINE
Payer: COMMERCIAL

## 2021-08-17 VITALS
TEMPERATURE: 98.7 F | HEART RATE: 74 BPM | SYSTOLIC BLOOD PRESSURE: 127 MMHG | OXYGEN SATURATION: 98 % | RESPIRATION RATE: 16 BRPM | DIASTOLIC BLOOD PRESSURE: 74 MMHG

## 2021-08-17 DIAGNOSIS — Z11.52 ENCOUNTER FOR SCREENING FOR COVID-19: Primary | ICD-10-CM

## 2021-08-17 LAB — SARS-COV-2 RNA RESP QL NAA+PROBE: POSITIVE

## 2021-08-17 PROCEDURE — U0005 INFEC AGEN DETEC AMPLI PROBE: HCPCS | Performed by: EMERGENCY MEDICINE

## 2021-08-17 PROCEDURE — U0003 INFECTIOUS AGENT DETECTION BY NUCLEIC ACID (DNA OR RNA); SEVERE ACUTE RESPIRATORY SYNDROME CORONAVIRUS 2 (SARS-COV-2) (CORONAVIRUS DISEASE [COVID-19]), AMPLIFIED PROBE TECHNIQUE, MAKING USE OF HIGH THROUGHPUT TECHNOLOGIES AS DESCRIBED BY CMS-2020-01-R: HCPCS | Performed by: EMERGENCY MEDICINE

## 2021-08-17 PROCEDURE — 99283 EMERGENCY DEPT VISIT LOW MDM: CPT

## 2021-08-17 PROCEDURE — 99282 EMERGENCY DEPT VISIT SF MDM: CPT | Performed by: EMERGENCY MEDICINE

## 2021-08-17 NOTE — Clinical Note
Freya Echeverria was seen and treated in our emergency department on 8/17/2021  Diagnosis:     Deni Basilio  may return to work on return date  He may return on this date: 08/30/2021    Or sooner with negative Covid-19 and no symptoms     If you have any questions or concerns, please don't hesitate to call        Haider Pritchard MD    ______________________________           _______________          _______________  Hospital Representative                              Date                                Time

## 2021-08-17 NOTE — Clinical Note
Fay Wesley was seen and treated in our emergency department on 8/17/2021  Diagnosis:     Chance Seaman  may return to work on return date  He may return on this date: 08/30/2021    Or sooner with negative Covid-19 and no symptoms     If you have any questions or concerns, please don't hesitate to call        Mckenzie Eden MD    ______________________________           _______________          _______________  Hospital Representative                              Date                                Time

## 2021-08-18 ENCOUNTER — HOSPITAL ENCOUNTER (EMERGENCY)
Facility: HOSPITAL | Age: 31
Discharge: HOME | End: 2021-08-18
Admitting: EMERGENCY MEDICINE
Payer: COMMERCIAL

## 2021-08-18 VITALS
TEMPERATURE: 97.6 F | DIASTOLIC BLOOD PRESSURE: 73 MMHG | RESPIRATION RATE: 18 BRPM | SYSTOLIC BLOOD PRESSURE: 130 MMHG | OXYGEN SATURATION: 99 % | HEART RATE: 58 BPM

## 2021-08-18 DIAGNOSIS — Z20.822 ENCOUNTER FOR LABORATORY TESTING FOR COVID-19 VIRUS: Primary | ICD-10-CM

## 2021-08-18 LAB — SARS-COV-2 RNA RESP QL NAA+PROBE: NEGATIVE

## 2021-08-18 PROCEDURE — U0003 INFECTIOUS AGENT DETECTION BY NUCLEIC ACID (DNA OR RNA); SEVERE ACUTE RESPIRATORY SYNDROME CORONAVIRUS 2 (SARS-COV-2) (CORONAVIRUS DISEASE [COVID-19]), AMPLIFIED PROBE TECHNIQUE, MAKING USE OF HIGH THROUGHPUT TECHNOLOGIES AS DESCRIBED BY CMS-2020-01-R: HCPCS | Performed by: EMERGENCY MEDICINE

## 2021-08-18 PROCEDURE — U0005 INFEC AGEN DETEC AMPLI PROBE: HCPCS | Performed by: EMERGENCY MEDICINE

## 2021-08-18 PROCEDURE — 99283 EMERGENCY DEPT VISIT LOW MDM: CPT

## 2021-08-18 PROCEDURE — 99282 EMERGENCY DEPT VISIT SF MDM: CPT | Performed by: EMERGENCY MEDICINE

## 2021-08-18 NOTE — ED ATTENDING ATTESTATION
8/17/2021  ISergio MD, saw and evaluated the patient  I have discussed the patient with the resident/non-physician practitioner and agree with the resident's/non-physician practitioner's findings, Plan of Care, and MDM as documented in the resident's/non-physician practitioner's note, except where noted  All available labs and Radiology studies were reviewed  I was present for key portions of any procedure(s) performed by the resident/non-physician practitioner and I was immediately available to provide assistance  At this point I agree with the current assessment done in the Emergency Department  I have conducted an independent evaluation of this patient a history and physical is as follows:  Patient here for COVID swabbing pre travel  Patient has no somatic complaints and no known COVID exposures  Benign physical exam, with normal vital signs, clear lungs, and regular rate and rhythm    Will plan to swab and discharge  ED Course         Critical Care Time  Procedures

## 2021-08-18 NOTE — ED PROVIDER NOTES
HPI: Patient is a 31 y o  male who presents with  no symptoms, requires screening  The patient has not had contact with people with similar symptoms  The patient has not taken any medication  He had COVID previously and is vaccinated  No Known Allergies    No past medical history on file  No past surgical history on file  Social History     Tobacco Use    Smoking status: Former Smoker     Types: Cigarettes    Smokeless tobacco: Never Used   Vaping Use    Vaping Use: Never used   Substance Use Topics    Alcohol use: Yes    Drug use: Never       Nursing notes reviewed  Physical Exam:  ED Triage Vitals [08/17/21 2026]   Temperature Pulse Respirations Blood Pressure SpO2   98 7 °F (37 1 °C) 74 16 127/74 98 %      Temp Source Heart Rate Source Patient Position - Orthostatic VS BP Location FiO2 (%)   Oral Monitor -- -- --      Pain Score       --           ROS: Positive for no sx, the remainder of a 10 organ system ROS was otherwise unremarkable  General: awake, alert, no acute distress    Head: normocephalic, atraumatic    Eyes: no scleral icterus  Ears: external ears normal, hearing grossly intact  Nose: external exam grossly normal, negative nasal discharge  Neck: symmetric, No JVD noted, trachea midline  Pulmonary: no respiratory distress, no tachypnea noted  Cardiovascular: appears well perfused  Abdomen: no distention noted  Musculoskeletal: no deformities noted, tone normal  Neuro: grossly non-focal  Psych: mood and affect appropriate    The patient is stable and has a history and physical exam consistent with a viral illness  COVID19 testing has been performed  I considered the patient's other medical conditions as applicable/noted above in my medical decision making  The patient is stable upon discharge  The plan is for supportive care at home      The patient (and any family present) verbalized understanding of the discharge instructions and warnings that would necessitate return to the Emergency Department  All questions were answered prior to discharge  Medications - No data to display  Final diagnoses:   Encounter for screening for COVID-19     Time reflects when diagnosis was documented in both MDM as applicable and the Disposition within this note     Time User Action Codes Description Comment    8/17/2021  8:31 PM Aracelis Lang Add [Z11 52] Encounter for screening for COVID-19       ED Disposition     ED Disposition Condition Date/Time Comment    Discharge Stable Tue Aug 17, 2021  8:31 PM Adrian Gerber Check discharge to home/self care  Follow-up Information     Follow up With Specialties Details Why [facility], DO Family Medicine Call   [place] [address]  [phone]          Patient's Medications   Discharge Prescriptions    No medications on file     No discharge procedures on file      Electronically Signed by       Munir Dumont MD  08/22/21 8102

## 2021-08-18 NOTE — ED PROVIDER NOTES
History  Chief Complaint   Patient presents with    Medical Problem     Pt reports having positive covid test, pt was advised to have retest     65-year-old male presenting due to request for COVID testing  States he was advised to receive a repeat test and denies any current symptoms including fever, chills, chest pain, dyspnea, n/v, hemoptysis  Denies known covid contacts and did receive the vaccine  Prior to Admission Medications   Prescriptions Last Dose Informant Patient Reported? Taking?   ondansetron (ZOFRAN-ODT) 4 mg disintegrating tablet  Self No No   Sig: Take 1 tablet (4 mg total) by mouth every 6 (six) hours as needed for nausea or vomiting   Patient not taking: Reported on 6/28/2021   oxyCODONE-acetaminophen (PERCOCET) 5-325 mg per tablet  Self No No   Sig: Take 1 tablet by mouth every 4 (four) hours as needed for moderate painMax Daily Amount: 6 tablets   Patient not taking: Reported on 6/28/2021      Facility-Administered Medications: None       History reviewed  No pertinent past medical history  History reviewed  No pertinent surgical history  History reviewed  No pertinent family history  I have reviewed and agree with the history as documented  E-Cigarette/Vaping    E-Cigarette Use Never User      E-Cigarette/Vaping Substances     Social History     Tobacco Use    Smoking status: Former Smoker     Types: Cigarettes    Smokeless tobacco: Never Used   Vaping Use    Vaping Use: Never used   Substance Use Topics    Alcohol use: Yes    Drug use: Never        Review of Systems   Constitutional: Negative for chills and fever  HENT: Negative for sore throat  Respiratory: Negative for cough and shortness of breath  Cardiovascular: Negative for chest pain  Neurological: Negative for weakness  All other systems reviewed and are negative        Physical Exam  ED Triage Vitals [08/18/21 0610]   Temperature Pulse Respirations Blood Pressure SpO2   97 6 °F (36 4 °C) 58 18 130/73 99 %      Temp Source Heart Rate Source Patient Position - Orthostatic VS BP Location FiO2 (%)   Oral Monitor Lying Right arm --      Pain Score       --             Orthostatic Vital Signs  Vitals:    08/18/21 0610   BP: 130/73   Pulse: 58   Patient Position - Orthostatic VS: Lying       Physical Exam  Vitals and nursing note reviewed  Constitutional:       Appearance: He is well-developed  HENT:      Head: Normocephalic and atraumatic  Right Ear: External ear normal       Left Ear: External ear normal    Eyes:      Conjunctiva/sclera: Conjunctivae normal    Cardiovascular:      Rate and Rhythm: Normal rate and regular rhythm  Pulmonary:      Effort: Pulmonary effort is normal  No respiratory distress  Breath sounds: Normal breath sounds  Abdominal:      General: There is no distension  Musculoskeletal:      Cervical back: Normal range of motion  Skin:     General: Skin is warm and dry  Neurological:      Mental Status: He is alert and oriented to person, place, and time  Psychiatric:         Mood and Affect: Mood normal          Behavior: Behavior normal          ED Medications  Medications - No data to display    Diagnostic Studies  Results Reviewed     Procedure Component Value Units Date/Time    Novel Coronavirus (Covid-19),PCR [facility] - 2 Hour Stat [[id number]]  (Normal) Collected: 08/18/21 0616    Lab Status: Final result Specimen: Nares from Nasopharyngeal Swab Updated: 08/18/21 0814     SARS-CoV-2 Negative    Narrative:                        No orders to display         Procedures  Procedures      ED Course                                       MDM  Number of Diagnoses or Management Options  Encounter for laboratory testing for COVID-19 virus  Diagnosis management comments: covid testing sent and patient will be contacted w results        Disposition  Final diagnoses:   Encounter for laboratory testing for COVID-19 virus     Time reflects when diagnosis was documented in both MDM as applicable and the Disposition within this note     Time User Action Codes Description Comment    8/18/2021  6:12 AM Lul Rivera Add [Z20 822] Encounter for laboratory testing for COVID-19 virus       ED Disposition     ED Disposition Condition Date/Time Comment    Discharge Stable Wed Aug 18, 2021  6:12 AM Sylvia Hussein Check discharge to home/self care  Follow-up Information    None         Discharge Medication List as of 8/18/2021  6:16 AM      CONTINUE these medications which have NOT CHANGED    Details   ondansetron (ZOFRAN-ODT) 4 mg disintegrating tablet Take 1 tablet (4 mg total) by mouth every 6 (six) hours as needed for nausea or vomiting, Starting Thu 6/17/2021, Normal      oxyCODONE-acetaminophen (PERCOCET) 5-325 mg per tablet Take 1 tablet by mouth every 4 (four) hours as needed for moderate painMax Daily Amount: 6 tablets, Starting Thu 6/17/2021, Normal           No discharge procedures on file  PDMP Review     None           ED Provider  Attending physically available and evaluated Praveen Boyle I managed the patient along with the ED Attending      Electronically Signed by         Merla Olszewski, DO  08/19/21 6510

## 2021-08-20 NOTE — ED ATTENDING ATTESTATION
8/18/2021  I, Isaiah Mayer MD, saw and evaluated the patient  I have discussed the patient with the resident/non-physician practitioner and agree with the resident's/non-physician practitioner's findings, Plan of Care, and MDM as documented in the resident's/non-physician practitioner's note, except where noted  All available labs and Radiology studies were reviewed  I was present for key portions of any procedure(s) performed by the resident/non-physician practitioner and I was immediately available to provide assistance  At this point I agree with the current assessment done in the Emergency Department  I have conducted an independent evaluation of this patient a history and physical is as follows:    ED Course     Patient presents for evaluation after having a positive COVID test yesterday  The patient has no symptoms and is returning for repeat test to rule out false positive  Patient is vaccinated      Critical Care Time  Procedures

## 2024-01-30 ENCOUNTER — HOSPITAL ENCOUNTER (EMERGENCY)
Facility: HOSPITAL | Age: 34
Discharge: HOME | End: 2024-01-31
Attending: EMERGENCY MEDICINE
Payer: COMMERCIAL

## 2024-01-30 VITALS
DIASTOLIC BLOOD PRESSURE: 92 MMHG | TEMPERATURE: 98.6 F | RESPIRATION RATE: 18 BRPM | SYSTOLIC BLOOD PRESSURE: 136 MMHG | OXYGEN SATURATION: 98 % | HEART RATE: 81 BPM

## 2024-01-30 DIAGNOSIS — F41.9 ANXIETY: Primary | ICD-10-CM

## 2024-01-30 PROCEDURE — 99283 EMERGENCY DEPT VISIT LOW MDM: CPT

## 2024-01-30 PROCEDURE — 99284 EMERGENCY DEPT VISIT MOD MDM: CPT | Performed by: EMERGENCY MEDICINE

## 2024-01-31 ENCOUNTER — TELEPHONE (OUTPATIENT)
Dept: PSYCHIATRY | Facility: CLINIC | Age: 34
End: 2024-01-31

## 2024-01-31 LAB
C TRACH DNA SPEC QL NAA+PROBE: NEGATIVE
N GONORRHOEA DNA SPEC QL NAA+PROBE: NEGATIVE

## 2024-01-31 PROCEDURE — 87591 N.GONORRHOEAE DNA AMP PROB: CPT | Performed by: EMERGENCY MEDICINE

## 2024-01-31 PROCEDURE — 87491 CHLMYD TRACH DNA AMP PROBE: CPT | Performed by: EMERGENCY MEDICINE

## 2024-01-31 NOTE — TELEPHONE ENCOUNTER
"Behavioral Health Outpatient Intake Questions    Referred By   : ASAP Referral    Please advise interviewee that they need to answer all questions truthfully to allow for best care, and any misrepresentations of information may affect their ability to be seen at this clinic   => Was this discussed? Yes     If Minor Child (under age 18)    Who is/are the legal guardian(s) of the child?     Is there a custody agreement? No     If \"YES\"- Custody orders must be obtained prior to scheduling the first appointment  In addition, Consent to Treatment must be signed by all legal guardians prior to scheduling the first appointment    If \"NO\"- Consent to Treatment must be signed by all legal guardians prior to scheduling the first appointment    Behavioral Health Outpatient Intake History -     Presenting Problem (in patient's own words):     Anxiety, Depression, Stress, Struggling to eat and drink for days    Are there any communication barriers for this patient?     Yes - but never put on meds for anything                                               If yes, please describe barriers: ADHD - Was diagnosed back in 8th grade  If there is a unique situation, please refer to [facility] for final determination.    Are you taking any psychiatric medications? No     If \"YES\" -What are they  none      If \"YES\" -Who prescribes?     Has the Patient previously received outpatient Talk Therapy or Medication Management from [facility]  No        If \"YES\"- When, Where and with Whom?         If \"NO\" -Has Patient received these services elsewhere?       If \"YES\" -When, Where, and with Whom?    Has the Patient abused alcohol or other substances in the last 6 months ? No  No concerns of substance abuse are reported.     If \"YES\" -What substance, How much, How often?     If illegal substance: Refer to [facility] (for [place]) or [facility].   If Alcohol in excess of 10 drinks per week:  Refer to [facility] (for [place]) or " "[facility]    Legal History-     Is this treatment court ordered? No   If \"yes \"send to :  Talk Therapy : Send to [facility] for final determination   Med Management: Send to Dr Nieto for final determination     Has the Patient been convicted of a felony?  No   If \"Yes\" send to -When, What?  Talk Therapy : Send to [facility] for final determination   Med Management: Send to Dr Nieto for final determination     ACCEPTED as a patient Yes  If \"Yes\" Appointment Date: 2/12 at 8am with Willow Roberts and 3/25 at 1:30pm with Dennsie Garcia    Referred Elsewhere? Yes  If “Yes” - (Where? Ex: [facility], [facility], [facility], [facility], etc.) PHP      Name of Insurance Co:[employer]  Insurance ID#  Insurance Phone #  If ins is primary or secondary?Primary  If patient is a minor, parents information such as Name, D.O.B of guarantor.  "

## 2024-01-31 NOTE — TELEPHONE ENCOUNTER
Patient has been added to the Medication Management and Talk Therapy wait list with a referral.    Insurance: [employer]  Insurance Type:    Commercial [x]   Medicaid []   County (if applicable)   Medicare []  Location Preference: [place]  Provider Preference: female  Virtual: Yes [] No [x]  Were outside resources sent: Yes [] No [x]    Pt would like a call back asap for appt

## 2024-01-31 NOTE — TELEPHONE ENCOUNTER
Contacted patient in regards to ASAP Referral  in attempts to verify patient's needs of services and add patient to proper wait list. spoke with patient whom stated they were interested.

## 2024-01-31 NOTE — ED PROVIDER NOTES
History  Chief Complaint   Patient presents with    Psychiatric Evaluation     Patient requesting crisis eval. States he has been dealing with a lot recently and has not been eating or drinking. -SI/HI     33-year-old male presents to the emergency department for evaluation of worsening anxiety and depression.  Symptoms have been progressively worsening over the course of the past few months.  Endorses multiple life stressors.  No current SI or HI.  Has had passive SI in the past.  No auditory or visual hallucinations.  No active drug use.  Not currently on any medications.  Decreased appetite.        Prior to Admission Medications   Prescriptions Last Dose Informant Patient Reported? Taking?   ondansetron (ZOFRAN-ODT) 4 mg disintegrating tablet  Self No No   Sig: Take 1 tablet (4 mg total) by mouth every 6 (six) hours as needed for nausea or vomiting   Patient not taking: Reported on 6/28/2021   oxyCODONE-acetaminophen (PERCOCET) 5-325 mg per tablet  Self No No   Sig: Take 1 tablet by mouth every 4 (four) hours as needed for moderate painMax Daily Amount: 6 tablets   Patient not taking: Reported on 6/28/2021      Facility-Administered Medications: None       History reviewed. No pertinent past medical history.    History reviewed. No pertinent surgical history.    History reviewed. No pertinent family history.  I have reviewed and agree with the history as documented.    E-Cigarette/Vaping    E-Cigarette Use Never User      E-Cigarette/Vaping Substances     Social History     Tobacco Use    Smoking status: Former     Types: Cigarettes    Smokeless tobacco: Never   Vaping Use    Vaping status: Never Used   Substance Use Topics    Alcohol use: Yes    Drug use: Never       Review of Systems   Constitutional:  Negative for chills and fever.   HENT:  Negative for ear pain and sore throat.    Eyes:  Negative for pain and visual disturbance.   Respiratory:  Negative for cough and shortness of breath.    Cardiovascular:   Negative for chest pain and palpitations.   Gastrointestinal:  Negative for abdominal pain and vomiting.   Genitourinary:  Negative for dysuria and hematuria.   Musculoskeletal:  Negative for arthralgias and back pain.   Skin:  Negative for color change and rash.   Neurological:  Negative for seizures and syncope.   Psychiatric/Behavioral:  Negative for hallucinations, self-injury and suicidal ideas. The patient is nervous/anxious.    All other systems reviewed and are negative.      Physical Exam  Physical Exam  Vitals and nursing note reviewed.   Constitutional:       General: He is not in acute distress.  HENT:      Head: Normocephalic and atraumatic.      Right Ear: External ear normal.      Left Ear: External ear normal.      Nose: Nose normal.      Mouth/Throat:      Mouth: Mucous membranes are moist.   Eyes:      Extraocular Movements: Extraocular movements intact.      Conjunctiva/sclera: Conjunctivae normal.      Pupils: Pupils are equal, round, and reactive to light.   Cardiovascular:      Rate and Rhythm: Normal rate and regular rhythm.      Pulses: Normal pulses.   Pulmonary:      Effort: Pulmonary effort is normal.      Breath sounds: No stridor.   Musculoskeletal:         General: No deformity. Normal range of motion.      Cervical back: Normal range of motion and neck supple.   Skin:     General: Skin is warm and dry.      Capillary Refill: Capillary refill takes less than 2 seconds.   Neurological:      General: No focal deficit present.      Mental Status: He is alert and oriented to person, place, and time.   Psychiatric:      Comments: No SI or HI, currently calm and cooperative, not actively hallucinating         Vital Signs  ED Triage Vitals [01/30/24 2345]   Temperature Pulse Respirations Blood Pressure SpO2   98.6 °F (37 °C) 81 18 136/92 98 %      Temp Source Heart Rate Source Patient Position - Orthostatic VS BP Location FiO2 (%)   Oral Monitor -- -- --      Pain Score       --            Vitals:    01/30/24 2345   BP: 136/92   Pulse: 81         Visual Acuity      ED Medications  Medications - No data to display    Diagnostic Studies  Results Reviewed       Procedure Component Value Units Date/Time    Chlamydia/GC amplified DNA by PCR [[id number]] Collected: 01/31/24 0144    Lab Status: In process Specimen: Urine, Other Updated: 01/31/24 0156                   No orders to display              Procedures  Procedures         ED Course                                             Medical Decision Making  33-year-old male presents for anxiety and depression.  No SI or HI.  Not actively hallucinating.  Currently calm and cooperative.  No indications for inpatient treatment at this time, patient met with crisis and referred to partial program. STD Testing offered given relationship problems, he denies any sxs.  Stable for discharge    Problems Addressed:  Anxiety: acute illness or injury    Amount and/or Complexity of Data Reviewed  Labs: ordered.    Risk  Decision regarding hospitalization.  Risk Details: Patient does not currently meet criteria for inpatient treatment at this time, was given referral to partial program             Disposition  Final diagnoses:   Anxiety     Time reflects when diagnosis was documented in both MDM as applicable and the Disposition within this note       Time User Action Codes Description Comment    1/31/2024  1:41 AM CheckRichard Add [F41.9] Anxiety           ED Disposition       ED Disposition   Discharge    Condition   Stable    Date/Time   Wed Jan 31, 2024 12:27 AM    Comment   Leo TURNER Check discharge to home/self care.                   MD Documentation      Flowsheet Row Most Recent Value   Sending MD Ramos Check MD          Follow-up Information       Follow up With Specialties Details Why Contact Info Additional Information    [facility] Emergency Medicine  If symptoms worsen [address] [place] [address]  [id number] [phone] [facility], [address], [place], [place], [address] [phone]    Be Sanchez DO Family Medicine Schedule an appointment as soon as possible for a visit   [address] [address]  [phone]               Discharge Medication List as of 1/31/2024  1:42 AM        CONTINUE these medications which have NOT CHANGED    Details   ondansetron (ZOFRAN-ODT) 4 mg disintegrating tablet Take 1 tablet (4 mg total) by mouth every 6 (six) hours as needed for nausea or vomiting, Starting Thu 6/17/2021, Normal      oxyCODONE-acetaminophen (PERCOCET) 5-325 mg per tablet Take 1 tablet by mouth every 4 (four) hours as needed for moderate painMax Daily Amount: 6 tablets, Starting Thu 6/17/2021, Normal                 PDMP Review       None            ED Provider  Electronically Signed by             Richard Horton MD  01/31/24 0740

## 2024-01-31 NOTE — ED NOTES
33 year old male presents for worsening anxiety and depression. Patient is alert/oriented/very pleasant and cooperative. Patient states he has had an extensive history of psychological trauma and lately he has had significant trouble coping.  Patient denies SI/HI/AH/VH at the moment but has had passive SI in the past with no current self harm.   Patient endorses disturbance to appetite and sleeping but denies any major medical history.  Denies any official psych history.   Has history of substance use in the past.  Patient is requesting psychological help.   Counseled patient on resources.

## 2024-01-31 NOTE — ED NOTES
[facility] REFERRAL     [facility]    Name and Date of Birth:  Leo Madden y.o. 1990    Date of Referral: January 31, 2024    Presenting Symptoms and Stressors:      Symptoms:  worsening depression, worsening anxiety, and    Stressors:  relationship problems, social difficulties, everyday stressors, ongoing anxiety, and past trauma     Access to Weapons:  No    Smoking Status: E-cig     Substance Use:   history of past use    Suicidal Ideation: None at present    Homicidal Ideation: None at present    Depressed Mood: None    Debi/Hypomania: None    Psychosis: None    Agitation: No    Appetite Changes: normal appetite    Sleep Disturbance: normal sleep    Diagnoses:  Unspecified Depressive Disorder  Anxiety Disorder    Current Psychiatrist or Therapist:    Psychiatrist: None  Therapist: None    Do they Require Ambulatory Assistance: No    Communication Assistance: not required     Legal Issues: None        Pretty Ibarra
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
## 2024-02-06 NOTE — PSYCH
Subjective:     Patient ID: Leo Horton is a 33 y.o. male and use pronouns     [facility] Clinical Progress Notes      Specialized Services Documentation  Therapist must complete separate progress note for each specific clinical activity in which the individual participated during the day.     Other:     Pre-Authorization: Spoke with Marybeth at [phone] for [employer]  used NPI [id number] for address [address] [place] [address]. [address] [place] [address]. 7 PHP days authorized from 2/07/2024 to 2/18/2024. Authorization Number [id number]. SUSY Chavira.     Case Management Note    SUSY Domingo     Current suicide risk : low    [id number] Met with Leo Horton at [facility]. Reviewed program, initial paperwork reviewed: Consent for Treatment, PHP handbook, HIPPA, General Consent, Client Bill of Rights, and Smoking/Drug and Alcohol Policy. Release of Information obtained for emergency contact - Jeancarlos Horton, brother ([phone]) and PCP and Health Care Coordination Form. Leo Horton declined hard copies of all paperwork and verbally gave consent. Reviewed and given on call number. PCP notified of admission and health care coordination form sent. Completed initial psycho-social evaluation and initial treatment goals discussed.    Medications changes/added/denied? No - See Dr. Eleanor Suero's Note     Treatment session number: 1    Individual Case Management Visit provided today? yes    [facility] follow up physician's orders: Admit to PHP - See Dr. Eleanor Jain's note

## 2024-02-07 ENCOUNTER — OFFICE VISIT (OUTPATIENT)
Dept: PSYCHIATRY | Facility: CLINIC | Age: 34
End: 2024-02-07
Payer: COMMERCIAL

## 2024-02-07 ENCOUNTER — OFFICE VISIT (OUTPATIENT)
Dept: PSYCHOLOGY | Facility: CLINIC | Age: 34
End: 2024-02-07
Payer: COMMERCIAL

## 2024-02-07 VITALS
DIASTOLIC BLOOD PRESSURE: 76 MMHG | WEIGHT: 139.8 LBS | BODY MASS INDEX: 20.01 KG/M2 | HEART RATE: 84 BPM | HEIGHT: 70 IN | SYSTOLIC BLOOD PRESSURE: 124 MMHG | RESPIRATION RATE: 16 BRPM

## 2024-02-07 DIAGNOSIS — F31.81 BIPOLAR II DISORDER (HCC): Primary | ICD-10-CM

## 2024-02-07 PROCEDURE — 90792 PSYCH DIAG EVAL W/MED SRVCS: CPT | Performed by: PSYCHIATRY & NEUROLOGY

## 2024-02-07 PROCEDURE — S0201 PARTIAL HOSPITALIZATION SERV: HCPCS

## 2024-02-07 PROCEDURE — 90834 PSYTX W PT 45 MINUTES: CPT

## 2024-02-07 PROCEDURE — G0410 GRP PSYCH PARTIAL HOSP 45-50: HCPCS

## 2024-02-07 PROCEDURE — 90791 PSYCH DIAGNOSTIC EVALUATION: CPT

## 2024-02-07 PROCEDURE — G0176 OPPS/PHP;ACTIVITY THERAPY: HCPCS

## 2024-02-07 RX ORDER — QUETIAPINE FUMARATE 50 MG/1
50 TABLET, EXTENDED RELEASE ORAL
COMMUNITY
Start: 2024-02-01

## 2024-02-07 NOTE — PSYCH
Subjective:    Patient ID: Leo Horton is a 33 y.o. male      [facility] Clinical Progress Notes      Specialized Services Documentation  Therapist must complete separate progress note for each specific clinical activity in which the individual participated during the day.       Allied Therapy ([id number]) Leo Horton was present for the last 10 minutes of group due to psychiatric evaluation. Group focused on saying a polite but firm “NO” to all situations, habits, and people who waste our time and drain our energy. Group started by taking some time to think about “What am I doing that's unnecessary, wasting my time, no longer bringing me edis, or purpose?” and “What/who is draining my energy and causing me stress?” After identifying these life-drainers, group members were asked to create a list challenging them to practice saying “NO” in an assertive manner for the next 30 days. Despite missing a majority of group, Bennett actively shared they would like to start saying no to arguing. Positive initial effort noted towards treatment plan. Continue to involve in life skills group to improve overall wellness through self-practice of establishing boundaries.  Tx Plan Objective: 1.1, 1.2, 1.4, Therapist:  GENARO Stevens/L    Education Therapy   [id number] Leo Horton attended last 10 minutes of morning assessment due to intake with .    Tx Plan Objective: 1.1, 1.2, 1.4, Therapist:  GENARO Stevens/MECHELLE

## 2024-02-07 NOTE — PSYCH
Subjective:     Patient ID: Leo Horton 33 y.o.     [facility] Clinical Progress Notes      Specialized Services Documentation  Therapist must complete separate progress note for each specific clinical activity in which the individual participated during the day.     Education Therapy         [id number] Leo Horton engaged throughout the treatment day. Was engaged in learning related to Illness, Medication, Aftercare and Wellness Tools.  Staff utilized verbal, written, and demonstration teaching methods. Leo Horton shared area of learning and set a goal for outside of program to attempt to get a good nights sleep.     Tx Plan Objective: 1.1, 1.2, 1.4, Therapist: Tru NICHOLS Ed.

## 2024-02-07 NOTE — PSYCH
Subjective:     Patient ID: Leo Horton is a 33 y.o. male.    [facility] Clinical Progress Notes      Specialized Services Documentation  Therapist must complete separate progress note for each specific clinical activity in which the individual participated during the day.     Group Psychotherapy ([id number])  Leo Horton engaged in a group focusing on practicing mindfulness, creating a more cohesive group environment, and allowing members to become more familiar with one another (to promote a more comfortable environment for sharing.  Each group member was given paper to write down four different unique traits, interesting facts, hobbies/interests, sources of pride or obstacles they have overcome in life.  After writing down each statement, each group member tore them into four different pieces of paper then crumbled them up and threw in one big basket.   then picked one at a time out of the basket with the group having to guess who it belonged to. If the group member was guessed correctly (the fact belonged to them), they had the opportunity to explain further and field questions from peers. Group members were encouraged to relate to similarities of other group members while also being mindful and engaging during the group. An example of the personal fact shared by Leo Horton was that he just moved here from [place].  Leo  will continue with life skills and psychotherapy groups.  Good progress made towards treatment. Continue psychotherapy groups to encourage further exploration of needs, personal awareness, and skills.    Tx Plan Objective: 1.1,1.2, 1.4   Therapist: Leo Henderson MS

## 2024-02-07 NOTE — PSYCH
Subjective:      Patient ID:Yoselin Horton 33 y.o. male     [facility] Clinical Progress Notes       Specialized Services Documentation  Therapist must complete separate progress note for each specific clinical activity in which the individual participated during the day.         Education Therapy      Group Psychotherapy    Time 1045 to 1145 - Leo Horton attended the psychoeducation group on Self Compassion. Group members were educated by this writer on the Self Compassion - the definition, the importance of, the barriers to and ways to begin to speak kindly and encouragingly to oneself.  This writer encouraged the members of group to continue utilizing the skills learned and to keep a Positive Journal for the coming week to assist in same.         Leo Horton displayed understanding through engagement in the topic with the group . For Leo Horton,  GOOD  progress toward goals was noted, through their engagement in group. Continue psychotherapy to encourage self-awareness and home practice of skills to support wellness.    Treatment Plan Objective 1.1, 1.2, 1.3, 1.4 Therapist: Bee uLdwig RN

## 2024-02-07 NOTE — BH TREATMENT PLAN
"Assessment/Plan:      Diagnoses and all orders for this visit:    Bipolar II disorder (HCC)      [facility] Treatment Plan     AREAS OF NEED: Leo Horton is experiencing symptoms related to his diagnosis of Bipolar II as evidenced by feeling depressed, feeling like a failure, experiencing episodes of of impulsivity, not sleeping for several days, expending money on things that he does not need, pressured speech, decreased energy, anxiety, and decreased concentration due to relationship and social stressors.    Date Initiated: 02/07/24    Strengths: \"I can make people laugh\"     LONG TERM GOAL:   Date Initiated: 02/07/24  1.0 While at [facility], I will engage in psychoeducational groups and practice skills that are aimed at improving my mental health, ability to cope and manage emotions, gain insights and skills to increase my quality of life.  Target Date: 03/06/2024  Completion Date:       SHORT TERM OBJECTIVES:     Date Initiated: 02/07/24  1.1 I will practice radical acceptance by learning how to accept situations that are outside of my control without judging them, to reduce the suffering that is caused by them  Revision Date:   Target Date: 02/16/2024  Completion Date:     Date Initiated: 02/07/24  1.2 In order to help manage my anger, I will learn the warning signs of my anger  I will verbalize situations that may trigger feelings of depression and/or anger and practice cognitive methods to control impulsive behavior  Revision Date:   Target Date: 02/16/2024  Completion Date:    Date Initiated: 02/07/24  1.3 I will take medications as prescribed and share questions and concerns if arise.    Revision Date:  Target Date: 02/16/2024  Completion Date:     Date Initiated: 02/07/24  1.4 I will identify 3 ways my supports can assist in my wellness journey and use them if/when needed.    Revision Date:  Target Date: 02/16/2024  Completion Date:          7 DAY REVISION:    Date Initiated:  Revision Date: "   Target Date:   Completion Date:      PSYCHIATRY:  Date Initiated:  02/07/24  Medication Management and Education      Revision Date:       The person(s) responsible for carrying out the plan is Dr. Eleanor Suero MD & KANWAL Carcamo     NURSING/SYMPTOM EDUCATION:  Date Initiated: 02/07/24       1.1, 1.2. 1.3, 1.4 Provide wellness/symptoms and skill education groups three to five days weekly to educate Leo Horton on signs and symptoms of diagnoses, skills to manage stressors, and medication questions that will be addressed by the treatment team.        Revision date:       The person(s) responsible for carrying out the plan is Leo Henderson MS & Bee Ludwig RN     PSYCHOLOGY:   Date Initiated: 02/07/24       1.1, 1.2, 1.4 Provide psychotherapy group 5 times per week to allow opportunity for Leo Horton  to explore stressors and ways of coping.   Revision Date:   The person(s) responsible for carrying out the plan is SUSY Chavira     ALLIED THERAPY:   Date Initiated: 02/07/24  1.1,1.2 Engage Leo Horton in AT group 5 times daily to encourage development and use of wellness tools to decrease symptoms and promote recovery through meaningful activity.  Revision Date:       The person(s) responsible for carrying out the plan is KIMBERLY Kolb and BRIANNA Stevens    CASE MANAGEMENT:   Date Initiated: 02/07/24      1.0 This  will meet with Leo Horton  3-4 times weekly to assess treatment progress, discharge planning, connection to community    supports and UR as indicated.  Revision Date:   The person(s) responsible for carrying out the plan is SUSY Domingo     TREATMENT REVIEW/COMMENTS:     DISCHARGE CRITERIA: Identify 3 signs of progress and complete relapse prevention plan.    DISCHARGE PLAN: Connect with identified outpatient providers.   Estimated Length of Stay: 10 treatment days      CLIENT COMMENTS / Please share your thoughts, feelings,  need and/or experiences regarding your treatment plan with Staff.  Please see follow up note with comments.      Signatures can be found on [facility] Treatment plan consent form.
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
## 2024-02-08 ENCOUNTER — OFFICE VISIT (OUTPATIENT)
Dept: PSYCHOLOGY | Facility: CLINIC | Age: 34
End: 2024-02-08
Payer: COMMERCIAL

## 2024-02-08 DIAGNOSIS — F31.81 BIPOLAR II DISORDER (HCC): Primary | ICD-10-CM

## 2024-02-08 PROCEDURE — G0177 OPPS/PHP; TRAIN & EDUC SERV: HCPCS

## 2024-02-08 PROCEDURE — G0410 GRP PSYCH PARTIAL HOSP 45-50: HCPCS

## 2024-02-08 PROCEDURE — S0201 PARTIAL HOSPITALIZATION SERV: HCPCS

## 2024-02-08 PROCEDURE — G0176 OPPS/PHP;ACTIVITY THERAPY: HCPCS

## 2024-02-08 NOTE — PSYCH
Subjective:     Patient ID: Leo Horton is a 33 y.o. male.    [facility] Clinical Progress Notes      Specialized Services Documentation  Therapist must complete separate progress note for each specific clinical activity in which the individual participated during the day.     Education Therapy     [id number] Leo Horton engaged throughout the treatment day. Was engaged in learning related to Illness, Medication, Aftercare, and Wellness Tools. Staff utilized Verbal, Written, A/V, and Demonstration teaching methods.  Leo Horton shared area of learning and set a goal for outside of program to go to the bank.      Tx Plan Objective: 1.1,1.2, 1.4   Therapist: Leo Henderson MS

## 2024-02-08 NOTE — TELEPHONE ENCOUNTER
Per [employer] from  patient med mgmt and talk therapy appt has been c/x     3/25 at 1:30pm with Dennise Garcia    2/26 @ [facility]

## 2024-02-08 NOTE — PSYCH
Subjective:     Patient ID: Leo Horton 33 y.o. Male    [facility] Clinical Progress Notes      Specialized Services Documentation  Therapist must complete separate progress note for each specific clinical activity in which the individual participated during the day.     Group Psychotherapy - Drama [place]     [id number] Leo Horton was attentive throughout in a part psychoeducational group and open processing group focused on the spectrum of healthy relationships and co-dependency qualities.  started group with questions about “Things I love…” to engage the group in conversation. Such questions included favorite thing to cook, best classical movie, and best vacation. This was demonstrated as how such conversations were healthy, supportive, and trustful of each other. The group was encouraged to engage in open conversation to facilitate beginnings of change process and ways to build stronger relationships. The group then engaged in open discussion with guided instruction about components of healthy relationships and identifying red, yellow, and green flags. As a whole, the group identified healthy components of a relationship which included:  Trust  [place]   [place] Respect  Support  Honesty  Fairness/Equity  Separate Identities   Good Communication   After, the group learned about the Drama to Empowerment Triangle (the persecutor, victim, and rescuer), and the role it plays in unhealthy relationship dynamics.  Group participants were then engaged in discussion on steps to take to avoid contributing to unhealthy interactions:  Notice a pattern  Keep a neutral attitude  Developing personal power  Right to personal agency  Recovery is for those who want it, not for those who need it   FOG (fear, obligation, guilt)   used the following structure to support and lead the group psychoeducation, open ended dialogue for processing, structure, engagement in worksheets and  "self-reflection, and peer support. Leo Horton was observed taking notes and engaging with others, but did not outwardly share. beginning progress noted towards goals. Continue with psychoeducation to further encourage exploration of development of skills for healthy relationships and identify when they may be falling into the drama triangle.     Tx Plan Objective 1.1, 1.2, 1.4 Therapist: SUSY Domingo.     OTHER: received voicemail from [facility] - [phone] regarding fix in authorized units. Chrissy reported auth was done via \"scripted,\" and they had the auth date wrong. 7 units authorized from 2/7/2024 thru 2/15/2024 with a review on 15th with Chrissy. Auth remains the same #: [id number].     Case Management Note    SUSY Domingo     Current suicide risk : Low     A case management session is not scheduled today with Leo Horton ; additionally, they did not request a CM meeting.  Leo Horton is aware of next scheduled 1:1.    Medications changes/added/denied? N/a     Treatment session number: 2    Individual Case Management Visit provided today? No    [facility] follow up physician's orders: None at this time   "

## 2024-02-08 NOTE — PSYCH
Subjective:    Patient ID: Leo Horton is a 33 y.o. male      [facility] Clinical Progress Notes      Specialized Services Documentation  Therapist must complete separate progress note for each specific clinical activity in which the individual participated during the day.       Allied Therapy ([id number]) Leo Horton engaged with and without prompts in group focused on Self-Esteem. Group defined self-esteem as the value we attach to ourselves.  provided an overview of self-esteem to include levels, influencers, and how to build self-esteem before moving into an open discussion. Bennett then participated in a self-esteem worksheet identifying positive attributes about themselves and one positive attribute for each group member. Some initial effort noted towards treatment plan through engagement in topic and attentiveness. Continue skills group to increase self-esteem by acknowledging and accepting positive qualities about oneself.  Tx Plan Objective: 1.1, 1.2, 1.4, Therapist:  BRIANNA Stevens    Education Therapy   [id number] Leo Horton actively shared in check in and goal review. Presented as Receptive related to readiness to learn.  Leo Horton  did complete goal from last treatment day identifying gaining hope. did not present with any barriers to learning.     Tx Plan Objective: 1.1, 1.2, 1.4, Therapist:  BRIANNA Stevens

## 2024-02-08 NOTE — PSYCH
Visit Time    Visit Start Time: 1230  Visit Stop Time: 1330  Total Visit Duration: 1 hour    Subjective:     Patient ID: Leo Horton is a 33 y.o. y.o. male.    [facility] Clinical Progress Notes      Specialized Services Documentation  Therapist must complete separate progress note for each specific clinical activity in which the individual participated during the day.     Psychotherapy Group   Leo Horton actively shared in Psychotherapy Group exploring DBT distress tolerance “crisis survival strategies”.  Group explored crisis survival strategies “ACCEPTS, TIP, Pros and Cons, Self Soothe, and STOP) reinforcing actions one could take to learn to tolerate stressful experiences, thoughts and urges.  Leo participated in STOP examples, PMR, and distracting with others thoughts to explore several strategies.  He felt he could put effort into practicing intense exercise.  Beginning progress toward goal noted.  Continue psychotherapy to encourage learning, practice, and home practice of skills to manage distress.   Tx Plan Objective: 1.1, Therapist:  Ebony HARRIS & Leo Henderson MS

## 2024-02-09 ENCOUNTER — OFFICE VISIT (OUTPATIENT)
Dept: PSYCHOLOGY | Facility: CLINIC | Age: 34
End: 2024-02-09
Payer: COMMERCIAL

## 2024-02-09 DIAGNOSIS — F31.81 BIPOLAR II DISORDER (HCC): Primary | ICD-10-CM

## 2024-02-09 PROCEDURE — G0176 OPPS/PHP;ACTIVITY THERAPY: HCPCS

## 2024-02-09 PROCEDURE — G0177 OPPS/PHP; TRAIN & EDUC SERV: HCPCS

## 2024-02-09 PROCEDURE — G0410 GRP PSYCH PARTIAL HOSP 45-50: HCPCS

## 2024-02-09 PROCEDURE — S0201 PARTIAL HOSPITALIZATION SERV: HCPCS

## 2024-02-09 NOTE — PSYCH
Subjective:     Patient ID: Leo Horton 33 y.o. Male    [facility] Clinical Progress Notes      Specialized Services Documentation  Therapist must complete separate progress note for each specific clinical activity in which the individual participated during the day.     GROUP PSYCHOTHERAPY - MISTY     ([id number]) Certified Peer Specialist (CPS), Diamond shared her life story as she co-led this session. Leo Horotn  attentively listened to CPS Diamond. This group encouraged power of learning about self, accepting illness and personal responsibility in recovery. Diamond also shared the acronym she uses to remind her about what tools she can use on a daily basis to maintain mental health wellness. Leo Horton was observed engaged and listening. He appeared fidgety at times with bouncing his legs and biting his nails. Community resources reviewed in addition to personal resources like the affirmations.  MISTY folder handed out to group members with local resources/support groups. Continue to note progress toward goals. Continue psychotherapy to encourage self -awareness and healthy engagement of supports.      TX Plan Objectives: 1.1, 1.2, 1.3, 1.4   Therapist: SUSY Domingo      Case Management    ([id number])Spoke with Leo Horton for case management. This writer checked in to see how his week has been since beginning PHP. Leo shared it is going well and he is getting used to the day to day structure. He said he might go dirt biking this weekend. He appeared fidgety and would occasionally laugh without any context. Leo Horton is aware of next scheduled 1:1 meeting.     Current suicide risk : Low      Medications changes/added/denied? n/a     Treatment session number: 3     Individual Case Management Visit provided today? Yes      [facility] follow up physician's orders: Continue to follow orders.   Therapist: SUSY Domingo

## 2024-02-09 NOTE — PSYCH
Subjective:     Patient ID: Leo TURNER Sol 33 y.o.         [facility] Clinical Progress Notes      Specialized Services Documentation  Therapist must complete separate progress note for each specific clinical activity in which the individual participated during the day.      Education Therapy     [id number] Bennett  engaged throughout the treatment day. Was engaged in learning related to Illness, Medication, Aftercare and Wellness Tools. Staff utilized Verbal, Written, A/V and Demonstration teaching methods. Leo Horton shared area of learning and set a goal for outside of program to relax

## 2024-02-09 NOTE — PSYCH
Subjective:    Patient ID: Leo Horton is a 33 y.o. male      [facility] Clinical Progress Notes      Specialized Services Documentation  Therapist must complete separate progress note for each specific clinical activity in which the individual participated during the day.       Allied Therapy ([id number]) Leo Horton was actively involved in hands on group related to self-expression through art. Group discussed therapeutic benefits of art to include self-expression, gratitude, stress relief, encourage creativity, boosts self-esteem, and an excellent way to practice mindfulness. After discussion, each group member was given a blank poster and encouraged to utilized various art supplies to express their thoughts, interests, hobbies, and any other unique traits. 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 Some beginning exploration of treatment goals. Continue to involve in psychotherapy and life skills groups.  Tx Plan Objective: 1.1, 1.2, 1.4, Therapist:  BRIANNA Stevens    Education Therapy   [phone] Leo Horton actively shared in check in and goal review. Presented as Receptive related to readiness to learn.  Leo Horton  did complete goal from last treatment day identifying gaining responsibility. did not present with any barriers to learning.     Tx Plan Objective: 1.1, 1.2, 1.4, Therapist:  BRIANNA Stevens

## 2024-02-09 NOTE — PSYCH
Subjective:     Patient ID:name@ 33 y.o.       [facility] Clinical Progress Notes       Specialized Services Documentation  Therapist must complete separate progress note for each specific clinical activity in which the individual participated during the day.         Group Psychotherapy  1230 to 1330    Leo Horton  participated actively in the wellness assessment, which evaluates progress on several different areas of wellness/wellbeing: physical, emotional, cognitive, vocational, social and spiritual. Clients rated their progress and discussed areas that need work. By completing and discussing areas of progress and challenges, members are connected and reminded that, in their mental health struggle, they are not alone. Topics of discussion revolved around positive experiences within each area of wellness as well as the challenging aspects to wellness within their past week.  Continue to note progress towards goals. Continue with psychotherapy to encourage the development and practice of reflecting on their mental health journey to alleviate symptoms and support wellness.  Tx Plan Objective 1.1, 1.2, 1.4 Therapist: Bee Ludwig RN

## 2024-02-12 ENCOUNTER — OFFICE VISIT (OUTPATIENT)
Dept: PSYCHOLOGY | Facility: CLINIC | Age: 34
End: 2024-02-12
Payer: COMMERCIAL

## 2024-02-12 DIAGNOSIS — F31.81 BIPOLAR II DISORDER (HCC): Primary | ICD-10-CM

## 2024-02-12 PROCEDURE — S0201 PARTIAL HOSPITALIZATION SERV: HCPCS

## 2024-02-12 PROCEDURE — G0410 GRP PSYCH PARTIAL HOSP 45-50: HCPCS

## 2024-02-12 PROCEDURE — G0176 OPPS/PHP;ACTIVITY THERAPY: HCPCS

## 2024-02-12 PROCEDURE — G0177 OPPS/PHP; TRAIN & EDUC SERV: HCPCS

## 2024-02-12 NOTE — PSYCH
Subjective:     Patient ID: Leo Horton 33 y.o. Male    [facility] Clinical Progress Notes      Specialized Services Documentation  Therapist must complete separate progress note for each specific clinical activity in which the individual participated during the day.     Case Management Note    SUSY Domingo     Current suicide risk : Low     A case management session is not scheduled today with Leo TURNER Sol ; additionally, they did not request a CM meeting.  Leo Horton is aware of next scheduled 1:1.    Medications changes/added/denied? N/a     Treatment session number: 4    Individual Case Management Visit provided today? No    [facility] follow up physician's orders: None at this time

## 2024-02-12 NOTE — PSYCH
Subjective:     Patient ID: Leo Horton 33 y.o.     [facility] Clinical Progress Notes      Specialized Services Documentation  Therapist must complete separate progress note for each specific clinical activity in which the individual participated during the day.     Education Therapy         [id number] Leo Horton engaged throughout the treatment day. Was engaged in learning related to Illness, Medication, Aftercare and Wellness Tools.  Staff utilized verbal, written, and demonstration teaching methods. Leo Horton shared area of learning and set a goal for outside of program to reapply to college.     Tx Plan Objective: 1.1, 1.2, 1.4, Therapist: Tru NICHOLS Ed.    Group Psychotherapy    This group was facilitated in a private office.  ([id number])Leo Horton actively  engaged in psychoeducational group about emotional regulation. The skill helps to maintain and regulate emotional expression/regulation. Group discovered strategies that help them to manage emotional well being on a short term and long term basis. The group talked about understanding the purpose, and meaning of emotional regulation in intellectual and emotional expression, regulation , and recognition, and how it affects themselves and others.. Teaching on the emphasis of self monitoring and self-care, who the group can go to for help was brought up as well. Group was encouraged to ask questions in an open forum at the end of group. Positive progress displayed through engagement in topic. Leo Horton will continue to engage in psychotherapy to encourage positive self realization.  Treatment Plan Objective 1.1, 1.2, 1.3, 1.4 Therapist: Reji NICHOLS Ed.

## 2024-02-12 NOTE — PSYCH
Subjective:     Patient ID: Leo Horton is a 33 y.o. male.    [facility] Clinical Progress Notes      Specialized Services Documentation  Therapist must complete separate progress note for each specific clinical activity in which the individual participated during the day.       Group Psychotherapy   [id number]    Leo Horton actively shared in psychotherapy group focused on DBT mindfulness strategies “what” skills.  Group tasks and discussions explored practice of “what” skills through observing, describing and participating.  Leo appeared engaged during progressive muscle relaxation. He identified they could practice mindfulness today through reading this evening.   Some effort noted toward treatment goal.  Continue psychotherapy to encourage the development and practice of mindfulness strategies to alleviate symptoms and support wellness.   Tx Plan Objective: 1.1, Therapist:  Ebony HARRIS

## 2024-02-12 NOTE — PSYCH
Subjective:    Patient ID: Leo Horton is a 33 y.o. male      [facility] Clinical Progress Notes      Specialized Services Documentation  Therapist must complete separate progress note for each specific clinical activity in which the individual participated during the day.       Allied Therapy ([id number]) Leo Horton was involved in life skills group focused on listening skills. Bennett participated in self-reflection to rate current listening skills and determine area(s) for improvement. Bennett appeared nervious but did engage in skills activity by selecting a conversation topic and role-playing at least once as the speaker and the listener. Group offered feedback on observed listening skills from others after each role-play. Bennett shared whom they feel has good listening skills by giving an example of how that person listens. Some beginning progress made towards treatment plan. Continue to involve in life skills group to increase communication, self-reflection, and build healthy relationships. Tx Plan Objective: 1.1, 1.2, 1.4, Therapist:  BRIANNA Stevens    Education Therapy   [id number] Leo Horton actively shared in check in and goal review. Presented as Receptive related to readiness to learn.  Leo Horton  did complete goal from last treatment day identifying gaining hope. did not present with any barriers to learning.     Tx Plan Objective: 1.1, 1.2, 1.4, Therapist:  BRIANNA Stevens

## 2024-02-13 ENCOUNTER — DOCUMENTATION (OUTPATIENT)
Dept: PSYCHOLOGY | Facility: CLINIC | Age: 34
End: 2024-02-13

## 2024-02-13 ENCOUNTER — APPOINTMENT (OUTPATIENT)
Dept: PSYCHOLOGY | Facility: CLINIC | Age: 34
End: 2024-02-13
Payer: COMMERCIAL

## 2024-02-13 NOTE — PROGRESS NOTES
Subjective:     Patient ID: Leo Horton 33 y.o. Male    [facility] Clinical Progress Notes      Specialized Services Documentation  Therapist must complete separate progress note for each specific clinical activity in which the individual participated during the day.     Case Management     Leo Horton is excused from program 02/13/24 due to inclement weather. Leo was offered virtual, but declined. Leo Horton will return to program on Wednesday 2/14/2024      Current Suicide Risk: unable to assess    Treatment Session: 5     Medication changes/added/denied? N/a     Individual Case Management Visit Provided Today? N/a      [facility] follow up physician's orders: None at this time.

## 2024-02-14 ENCOUNTER — OFFICE VISIT (OUTPATIENT)
Dept: PSYCHIATRY | Facility: CLINIC | Age: 34
End: 2024-02-14
Payer: COMMERCIAL

## 2024-02-14 ENCOUNTER — OFFICE VISIT (OUTPATIENT)
Dept: PSYCHOLOGY | Facility: CLINIC | Age: 34
End: 2024-02-14
Payer: COMMERCIAL

## 2024-02-14 DIAGNOSIS — F31.81 BIPOLAR II DISORDER (HCC): Primary | ICD-10-CM

## 2024-02-14 PROCEDURE — S0201 PARTIAL HOSPITALIZATION SERV: HCPCS

## 2024-02-14 PROCEDURE — G0177 OPPS/PHP; TRAIN & EDUC SERV: HCPCS

## 2024-02-14 PROCEDURE — G0410 GRP PSYCH PARTIAL HOSP 45-50: HCPCS

## 2024-02-14 PROCEDURE — 99214 OFFICE O/P EST MOD 30 MIN: CPT | Performed by: NURSE PRACTITIONER

## 2024-02-14 PROCEDURE — G0176 OPPS/PHP;ACTIVITY THERAPY: HCPCS

## 2024-02-14 NOTE — PSYCH
Subjective:    Patient ID: Leo Horton is a 33 y.o. male      [facility] Clinical Progress Notes      Specialized Services Documentation  Therapist must complete separate progress note for each specific clinical activity in which the individual participated during the day.       Allied Therapy ([id number]) Leo Horton was involved in life skills group focused on self-care. Group opened by answering what they think self-care is before discussing different areas of our lives that need extra nurturing. Areas of discussion included professional, mental, spiritual, and social self-care. After, group members took turns sharing ideas to compile a list of activities to incorporate into their own self-care routine. With a direct prompt, Leo was able to identify that he could take a drive as a way to practice self-care this week. Minimal effort displayed towards treatment. Leo was not engaged, he appeared overly anxious biting his nails the entire group. Continue to involve in life skills group to explore wellness tools to maintain health and well-being. Tx Plan Objective: 1.1, 1.2, 1.3, 1.4, Therapist:  BRIANNA Stevens    Education Therapy   [id number] Leo Horton actively shared in check in and goal review. Presented as Receptive related to readiness to learn.  Leo Horton  did complete goal from last treatment day identifying gaining hope and responsibility. did not present with any barriers to learning.     Tx Plan Objective: 1.1, 1.2, 1.4, Therapist:  BRIANNA Stevens

## 2024-02-14 NOTE — PSYCH
Subjective:     Patient ID: Leo Horton 33 y.o. Male    [facility] Clinical Progress Notes      Specialized Services Documentation  Therapist must complete separate progress note for each specific clinical activity in which the individual participated during the day.     Case Management    ([phone]) Spoke with Leo Horton for case management. Leo shared that he is doing well and is looking to discharge either next Wednesday 2/21/24 of Thursday 2/22/2024. UR is scheduled for tomorrow - 2/15/2024 with Chrissy at 12 PM.  Leo Horton is aware of next scheduled 1:1 meeting.     Current suicide risk : Low      Medications changes/added/denied? see KANWAL Carcamo's note     Treatment session number: 5     Individual Case Management Visit provided today? Yes      [facility] follow up physician's orders: Continue to follow orders.   Therapist: SUSY Domingo

## 2024-02-14 NOTE — PSYCH
Subjective:     Patient ID: Leo Horton is a 33 y.o. male.    [facility] Clinical Progress Notes      Specialized Services Documentation  Therapist must complete separate progress note for each specific clinical activity in which the individual participated during the day.     Group Psychotherapy [id number]     This group consisted of roughly 20 minutes which focused on the science/benefits of yoga/Desean Chi (with a Desean Chi practice included) and 40 minutes of learning the science behind/practicing Emotional Freedom Tapping. The EFT discussion began with a conversation focusing around the understanding the science behind emotional freedom tapping and in which ways it can help improve lives (sleep, stress, diet, etc). We then discussed the technique in detail and practiced it multiple times as a group.  Everyone was also provided with EFT/Yoga packets which described the practice and efficacy of each. Following reflecting on the EFT practice, the group then began discussion on the research-based benefits of yoga and Desean Chi. We then practiced Desean Chi as a group. The Desean Chi practice was also followed by a period of reflection/discussion. Leo Horton participated by engaging in EFT and some of Qigong and reflecting on how he felt. Leo continues to work on treatment goals. Continue psychotherapy groups to encourage further exploration of needs, personal awareness, and skills.     Tx Plan Objective: 1.1,1.2, 1.4   Therapist: Leo Henderson MS

## 2024-02-14 NOTE — PSYCH
PHP MEDICATION MANAGEMENT NOTE        [facility]    Name and Date of Birth:  Leo Madden y.o. 1990 MRN: [id number]    Date of Visit: February 14, 2024    Visit Time    Visit Start Time: 1200   Visit Stop Time: 1230  Total Visit Duration:  30 minutes     The total visit duration detailed above includes: patient engagement, medication management, psychotherapy/counseling, discussion regarding treatment goals, and coordination of care.      Note Share Disclaimer:     This note was not shared with the patient due to reasonable likelihood of causing patient harm    No Known Allergies  SUBJECTIVE:    Leo is seen today for a follow up for Bipolar Disorder type II. He continues to improve gradually since beginning PHP. Smiling and bright, may be minimizing symptoms.  Denies any suicidal thoughts or passive death wish.  Feels he is gaining insights and learning new skills at partial. Continues with depressive and anxiety symptoms, denies panic symptoms.  Feels some improvement with Seroquel XR 50 mg, but may benefit from dose increase.  He will see his new outpatient psychiatric provider tomorrow and discuss with her. He does admit to symptoms of impulsivity, excessive spending in past. Minimizes any manic symptoms now.  Presents overly bright to circumstances.    He denies any side effects from current psychiatric medications.    PLAN:  Continue Seroquel XR 50 mg daily    Aware of 24 hour and weekend coverage for urgent situations accessed by calling [facility] main practice number  Continue partial hospitalization program    Diagnoses and all orders for this visit:    Bipolar II disorder (HCC)        Current Outpatient Medications on File Prior to Visit   Medication Sig Dispense Refill    QUEtiapine (SEROquel XR) 50 mg Take 50 mg by mouth daily at bedtime       No current facility-administered medications on file prior to visit.        HPI ROS Appetite Changes and Sleep:     He reports fluctuating sleep pattern, fluctuating appetite, fluctuating energy levels. Denies homicidal ideation, denies suicidal ideation    Review Of Systems:   no complaints, all other systems are negative         Mental Status Evaluation:    Appearance:  age appropriate   Behavior:  pleasant, cooperative   Speech:  normal rate, normal volume   Mood:  improved   Affect:  brighter, overly bright for circumstances   Thought Process:  goal directed   Associations: intact associations   Thought Content:  no overt delusions   Perceptual Disturbances: none   Risk Potential: Suicidal ideation - None  Homicidal ideation - None  Potential for aggression - No   Sensorium:  oriented to person, place, and time/date   Memory:  recent and remote memory grossly intact   Consciousness:  alert and awake   Attention: attention span and concentration appear shorter than expected for age   Insight:  improving   Judgment: improving   Gait/Station: normal gait/station, normal balance   Motor Activity: no abnormal movements       History Review:The following portions of the patient's history were reviewed and updated as appropriate: psychiatric history, trauma history allergies, current medications, past family history, past medical history, past social history, past surgical history, and problem list   OBJECTIVE:     Vital signs in last 24 hours:    There were no vitals filed for this visit.  Laboratory Results: I have personally reviewed all pertinent laboratory/tests results.    Medications Risks/Benefits:      Risks, Benefits And Possible Side Effects Of Medications:    Discussed risks and benefits of treatment with patient including risk of parkinsonian symptoms, metabolic syndrome, tardive dyskinesia and neuroleptic malignant syndrome related to treatment with antipsychotic medications     Controlled Medication Discussion:     Not applicable - controlled prescriptions are not  prescribed by this practice    KANWAL Carcamo 02/14/24

## 2024-02-14 NOTE — PSYCH
"Subjective:     Patient ID: Leo Horton 33 y.o.     [facility] Clinical Progress Notes      Specialized Services Documentation  Therapist must complete separate progress note for each specific clinical activity in which the individual participated during the day.     Education Therapy         [id number] Leo Horton engaged throughout the treatment day. Was engaged in learning related to Illness, Medication, Aftercare and Wellness Tools.  Staff utilized verbal, written, and demonstration teaching methods. Leo Horton shared area of learning and set a goal for outside of program to get a haircut.     Tx Plan Objective: 1.1, 1.2, 1.4, Therapist: Tru NICHOLS Ed.    Group Psychotherapy      Group Therapy    Subjective:     Patient ID: Leo Horton 33 y.o.     This group was facilitated in a private office.  ([phone])Leo Horton actively  engaged in psychoeducational group about radical acceptance. The skill helps an individual to learn to accept situations that are out of ones control. Reducing denial of situations and reducing distress..Group discovered strategies that help them to manage emotional well being on a short term and long term basis. The introduction of the concept of \"wise mid\" is used in this process.The group talked about understanding the purpose and meaning radical acceptance with  \"wise mind\" ,regulation , recognition, and how it affects themselves and others..Teaching on the emphasis of radical acceptance, who the group can go to for help was brought up as well. Group was encouraged to ask questions in an open forum at the end of group. Positive progress displayed through engagement in topic. Leo Horton will continue to engage in psychotherapy to encourage positive self realization.  Treatment Plan Objective 1.1, 1.2, 1.3, 1.4 Therapist: Reji NICHOLS Ed.   "

## 2024-02-15 ENCOUNTER — OFFICE VISIT (OUTPATIENT)
Dept: PSYCHOLOGY | Facility: CLINIC | Age: 34
End: 2024-02-15
Payer: COMMERCIAL

## 2024-02-15 DIAGNOSIS — F31.81 BIPOLAR II DISORDER (HCC): Primary | ICD-10-CM

## 2024-02-15 PROCEDURE — S0201 PARTIAL HOSPITALIZATION SERV: HCPCS

## 2024-02-15 PROCEDURE — G0410 GRP PSYCH PARTIAL HOSP 45-50: HCPCS

## 2024-02-15 PROCEDURE — G0176 OPPS/PHP;ACTIVITY THERAPY: HCPCS

## 2024-02-15 PROCEDURE — G0177 OPPS/PHP; TRAIN & EDUC SERV: HCPCS

## 2024-02-15 NOTE — PSYCH
Subjective:      Patient ID: Leo Horton 33 y.o. male    [facility] Clinical Progress Notes       Specialized Services Documentation  Therapist must complete separate progress note for each specific clinical activity in which the individual participated during the day.         Group Education Therapy     [phone] - Leo Horton was actively engaged in the psychoeducational group which focused on Sleep Hygiene.  A video on Sleep Hygiene was viewed and discussed. This writer explained the importance of quality sleep in relation to mental health and wellness. Group identified sleep hygiene habits that are currently effective and habits they wish to incorporate into their nighttime routine to promote sleep hygiene. A tip sheet and Sleep Diary were provided to each group member. Group was encouraged to ask questions in an open forum during the group. Leo Horton displayed understanding through engagement in the topic with the group . For Leo Horton ,  GOOD  progress toward goals was noted, through their engagement in group. Continue psychotherapy to encourage self-awareness and home practice of skills to support wellness.    Treatment Plan Objective 1.1, 1.2, 1.3, 1.4 Therapist: Bee Ludwig RN

## 2024-02-15 NOTE — PSYCH
Subjective:     Patient ID: Leo Horton 33 y.o. Male    [facility] Clinical Progress Notes      Specialized Services Documentation  Therapist must complete separate progress note for each specific clinical activity in which the individual participated during the day.     Utilization Review: Spoke with Nelly Arrieta at [facility] - phone: [phone].  used NPI: [id number] for address [address] [place] [address], [place] [address].  Due to missing 2/13/2024 due to inclement weather, Nelly extended the authorization 1 day to 2/16/2024. She still completed the live review due to no availability Friday 2/16/2025. New authorization for continued review stay authorized from Monday 2/19/2024 thru Wednesday 2/21/2024 for a total of 3 PHP days. Authorization number remains the same: [id number]. Reviewer Assigned SUSY Smart.      1 day extension from 2/7/2024 thru 2/16/2024  New authorization from 2/19/ thru 2/21/2024     Group Psychotherapy - Grief / Open Process     ([id number]) Leo Horton was attentive throughout  a part psycho-education and part open process group on grief and loss. Group facilitator discussed death and non-death related losses.  used the metaphor of a knotted ball of yarn and explained that grief is not a linear process. We can be in one stage of grief today and another tomorrow, and we vacillate between several positive and unpleasant emotions for some time before making peace. Group facilitator discussed 3 keys things to know about grief:     Grief is healthy and a necessary process  You building resilience by honoring and replacing what you lost  Through loss, you learn to value life    The group was led in discussion about the types of grief and the differences between Grief, Bereavement, and Mourning. It was then followed with conversation about grieving your past self and the group was encouraged to share any  experiences they may have with grief and loss death or non-death related lossed (home, divorce, health, loss of innocence, abuse), and how it has impacted their wellness journey. Group Facilitator provided 5 steps for grieving our past selves:    Be an ally to your past self  Acknowledge   Attend to  Affirm   Adopt an accepting and loving stance     Through this discussion dialogue was incorporated with the five states of grief were discussed - (5 stages: Denial, Anger, Bargaining, Depression, and Acceptance). Members were then introduced to the sixth stage of grief: Meaning Making coined by Vineet Lamb. Next, the group participated in an activity to help process their grief and dialogue / self reflection was encouraged.   Leo Horton demonstrated some effort towards treatment plan goals as evidenced by some active engagement in group and related to other group members, but did not outwardly share. He was observed biting his fingers at times and appearing fidgety. Provide continued psychoeducation and engaged participants in self-reflection surrounding their personal grief journey.    Tx Plan Objective 1.1, 1.2, 1.4 Therapist: SUSY Domingo.    Case Management    ([phone]) Called/Spoke with Leo Horton for case management. Discussed upcoming tx plan review and was provided with his labs completed by  to provide to psychiatrist. Leo will be discharged Wednesady 2/12/2/2024.  Leo Horton is aware of next scheduled 1:1 meeting.     Current suicide risk : Low      Medications changes/added/denied? n/a     Treatment session number: 6     Individual Case Management Visit provided today? Yes      [facility] follow up physician's orders: Continue to follow orders.   Therapist: SUSY Domingo

## 2024-02-15 NOTE — PSYCH
Subjective:     Patient ID: Leo Horton is a 33 y.o. male.    [facility] Clinical Progress Notes      Specialized Services Documentation  Therapist must complete separate progress note for each specific clinical activity in which the individual participated during the day.     Education Therapy     [id number] Leo Horton engaged throughout the treatment day. Was engaged in learning related to Illness, Medication, Aftercare, and Wellness Tools. Staff utilized Verbal, Written, A/V, and Demonstration teaching methods.  Leo Horton shared area of learning and set a goal for outside of program to attend his psychiatrist appointment.      Tx Plan Objective: 1.1,1.2, 1.4   Therapist: Leo Henderson MS

## 2024-02-16 ENCOUNTER — OFFICE VISIT (OUTPATIENT)
Dept: PSYCHOLOGY | Facility: CLINIC | Age: 34
End: 2024-02-16
Payer: COMMERCIAL

## 2024-02-16 DIAGNOSIS — F31.81 BIPOLAR II DISORDER (HCC): Primary | ICD-10-CM

## 2024-02-16 PROCEDURE — G0177 OPPS/PHP; TRAIN & EDUC SERV: HCPCS

## 2024-02-16 PROCEDURE — S0201 PARTIAL HOSPITALIZATION SERV: HCPCS

## 2024-02-16 PROCEDURE — G0176 OPPS/PHP;ACTIVITY THERAPY: HCPCS

## 2024-02-16 PROCEDURE — G0410 GRP PSYCH PARTIAL HOSP 45-50: HCPCS

## 2024-02-16 NOTE — PSYCH
Group Psychotherapy    Subjective:     Patient ID: Leo Horton 33 y.o.    This group was facilitated in a private office.  ([phone]) Leo Horton actively  engaged in psychoeducational group about unconventional coping strategies. The skills introduced help to maintain and regulate emotions and create healthy non-conventional coping skills. Group discovered strategies that help them to manage emotional stress and create unconventional coping strategies that will help emotional regulation on a short term basis.The group talked about understanding the purpose, and meaning of emotional regulation and the importance of emotional expression, regulation , and recognition, and how it affects themselves and others. Teaching on the skill process of unconventional coping strategies and DBT self-care, members of the group are able to manage short term situations with varied direct tangible coping stratiges. Group was encouraged to ask questions in an open forum at the end of group. Positive progress displayed through engagement in topic. Leo Horton will continue to engage in psychotherapy to encourage positive self realization.  Treatment Plan Objective 1.1, 1.2, 1.3, 1.4 Therapist: Reji NICHOLS Ed.

## 2024-02-16 NOTE — BH CRISIS PLAN
Client Name: Leo Horton       Client YOB: 1990    [facility] Safety Plan      Creation Date: 2/16/24 Update Date: 12/16/24   Created By: Nohemi Evans       Step 1: Warning Signs:   Warning Signs   Substance Abuse Thoughts   Decreased Self-Esteem   Decreased Confidence            Step 2: Internal Coping Strategies:   Internal Coping Strategies   sit by myself in silence for a couple of minutes   go for a run            Step 3: People and social settings that provide distraction:   Name Contact Information   Brother Mcclellan [phone]    Places   Brother's House           Step 4: People whom I can ask for help during a crisis:      Name Contact Information    Brother Mcclellan [phone]      Step 5: Professionals or agencies I can contact during a crisis:      Clinican/Agency Name Phone Emergency Contact    Dr. Yordy Agosto, Psychologist In Cell Phone         Crisis Phone Numbers:   Suicide Prevention Lifeline: Call or Text  957 Crisis Text Line: Text HOME to [phone]   Please note: Some [facility] do not have a separate number for Child/Adolescent specific crisis. If your county is not listed under Child/Adolescent, please call the adult number for your county      Adult Crisis Numbers: Child/Adolescent Crisis Numbers   [facility]: [phone] [facility]: [phone]   [facility]: [phone] [facility]: [phone]   [facility]: [phone] [place]: [phone]   [facility]: [phone] [facility]: [phone]   [facility]: [phone]   [facility]: [phone]   [facility]: [phone]   [facility]: [phone] (daytime) [phone] (after hours, weekends, holidays)      Step 6: Making the environment safer (plan for lethal means safety):   Plan: Stay up to date on my medications, cut off people in my life that cause me harm/mistreatment      Optional: What is most  important to me and worth living for?   My family and my future goals      [facility]ermaine Safety Plan. Ellie Rojas and Jt Bermudez. Used with permission of the authors.

## 2024-02-16 NOTE — PSYCH
Visit Time    Visit Start Time: 1230  Visit Stop Time:  1330  Total Visit Duration: 60 minutes    Subjective:     Patient ID: Leo Horton is 33 y.o. y.o. male.    [facility] Clinical Progress Notes      Specialized Services Documentation  Therapist must complete separate progress note for each specific clinical activity in which the individual participated during the day.       Group Psychotherapy   Leo Horton actively shared in psychotherapy group exploring facets of wellness through the weekly wellness inventory.   Leo shared in the exploration of physical, emotional, cognitive, personal development, social, spiritual, and activities of daily living growth. Participants were asked to assess their engagement in several active ways to live these areas of wellness. He engaged in a stretching exercise, object meditation, and GLAD journaling. offered tools including the Eisenhower Matrix and Emotions Journal strategies. He offered feedback and shared a desire to work on setting goals and emotions journaling.  Positive progress toward goal. Continue group to explore strategies and awareness for active engagement in one's wellness journey.    Tx Plan Objective: 1.1, 1.2, Therapist:  Ebony HARRIS

## 2024-02-16 NOTE — BH TREATMENT PLAN
"Assessment/Plan:      Diagnoses and all orders for this visit:     Bipolar II disorder (HCC)        [facility] Treatment Plan      AREAS OF NEED: Leo Horton is experiencing symptoms related to his diagnosis of Bipolar II as evidenced by feeling depressed, feeling like a failure, experiencing episodes of of impulsivity, not sleeping for several days, expending money on things that he does not need, pressured speech, decreased energy, anxiety, and decreased concentration due to relationship and social stressors.     Date Initiated: 02/07/24     Strengths: \"I can make people laugh\"        LONG TERM GOAL:   Date Initiated: 02/07/24  1.0 While at [facility], I will engage in psychoeducational groups and practice skills that are aimed at improving my mental health, ability to cope and manage emotions, gain insights and skills to increase my quality of life.  Target Date: 03/06/2024  Completion Date:         SHORT TERM OBJECTIVES:      Date Initiated: 02/07/24  1.1 I will practice radical acceptance by learning how to accept situations that are outside of my control without judging them, to reduce the suffering that is caused by them  Revision Date: 02/16/2024  Target Date: 02/16/2024; 2/27/2024  Completion Date:      Date Initiated: 02/07/24  1.2 In order to help manage my anger, I will learn the warning signs of my anger  I will verbalize situations that may trigger feelings of depression and/or anger and practice cognitive methods to control impulsive behavior  Revision Date:   Target Date: 02/16/2024; 2/27/2024  Completion Date:     Date Initiated: 02/07/24  1.3 I will take medications as prescribed and share questions and concerns if arise.    Revision Date:02/16/2024  Target Date: 02/16/2024; 2/27/2024  Completion Date:      Date Initiated: 02/07/24  1.4 I will identify 3 ways my supports can assist in my wellness journey and use them if/when needed.    Revision Date:02/16/2024  Target Date: 02/16/2024; " 2/27/2024  Completion Date:            7 DAY REVISION:  1.5: Treatment plan goals remain the same as they are relevant to my overall goal and objectives as I plan for discharge on Wednesday 2/21/2024.    Date Initiated: 02/16/2024  Revision Date:   Target Date: 2/27/2024  Completion Date:        PSYCHIATRY:  Date Initiated:  02/07/24  Medication Management and Education      Revision Date: 02/16/2024      The person(s) responsible for carrying out the plan is Dr. Eleanor Suero MD & KANWAL Carcamo      NURSING/SYMPTOM EDUCATION:  Date Initiated: 02/07/24       1.1, 1.2. 1.3, 1.4 Provide wellness/symptoms and skill education groups three to five days weekly to educate Leo Horton on signs and symptoms of diagnoses, skills to manage stressors, and medication questions that will be addressed by the treatment team.        Revision date:02/16/2024       The person(s) responsible for carrying out the plan is Leo Henderson MS & Bee Ludwig RN      PSYCHOLOGY:   Date Initiated: 02/07/24       1.1, 1.2, 1.4 Provide psychotherapy group 5 times per week to allow opportunity for Leo Horton  to explore stressors and ways of coping.   Revision Date: 02/16/2024  The person(s) responsible for carrying out the plan is SUSY Chavira      ALLIED THERAPY:   Date Initiated: 02/07/24  1.1,1.2 Engage Leo Horton in AT group 5 times daily to encourage development and use of wellness tools to decrease symptoms and promote recovery through meaningful activity.  Revision Date: 02/16/2024      The person(s) responsible for carrying out the plan is KIMBERLY Kolb and BRIANNA Stevens     CASE MANAGEMENT:   Date Initiated: 02/07/24      1.0 This  will meet with Leo Horton  3-4 times weekly to assess treatment progress, discharge planning, connection to community supports and UR as indicated.  Revision Date: 02/16/2024  The person(s) responsible for carrying out the plan is  SUSY Domingo      TREATMENT REVIEW/COMMENTS: Tx. Plan was reviewed and updated with Leo Horton. Leo Horton relates understanding to his diagnosis and overall treatment plan. He is agreeable.      DISCHARGE CRITERIA: Identify 3 signs of progress and complete relapse prevention plan.    DISCHARGE PLAN: Connect with identified outpatient providers.   Estimated Length of Stay: 10 treatment days       CLIENT COMMENTS / Please share your thoughts, feelings, need and/or experiences regarding your treatment plan with Staff.  Please see follow up note with comments.        Signatures can be found on [facility] Treatment plan consent form.

## 2024-02-16 NOTE — PSYCH
Subjective:    Patient ID: Leo Horton is a 33 y.o. male      [facility] Clinical Progress Notes      Specialized Services Documentation  Therapist must complete separate progress note for each specific clinical activity in which the individual participated during the day.       Allied Therapy ([id number]) Leo Horton was quietly involved in life skills group focused on reconnecting with the present by utilizing grounding techniques. Bennett was observed to be engaged in therapist led activities which included: 5-4-3-2-1 technique using our five senses, categories, body awareness, and mental exercises. Group discussed the importance of experimenting to see what works best for them and practicing consistently. Bennett identified he would utilize list all the object he can see. Positive progress made towards treatment goals. Continue to involve in skills group to increase wellness tools and encourage self-practice.  Tx Plan Objective: 1.1, 1.2, 1.4, Therapist:  BRIANNA Stevens    Education Therapy   [id number] Leo Horton actively shared in check in and goal review. Presented as Receptive related to readiness to learn.  Leo Horton  did complete goal from last treatment day identifying gaining hope, education, advocacy, responsibility, and support. did not present with any barriers to learning.     Tx Plan Objective: 1.1, 1.2, 1.4, Therapist:  BRIANNA Stevens

## 2024-02-16 NOTE — PSYCH
"Subjective:     Patient ID: Leo Horton is a 33 y.o. Male    [facility] Clinical Progress Notes      Specialized Services Documentation  Therapist must complete separate progress note for each specific clinical activity in which the individual participated during the day.     Education Therapy     [id number] Leo Horton engaged throughout the treatment day. Was engaged in learning related to Illness, Medication, Aftercare, and Wellness Tools. Staff utilized Verbal, Written, A/V, and Demonstration teaching methods. Leo Horton shared area of learning and set a goal for outside of program to relax and get ready to go back to work.      Tx Plan Objective: 1.1, 1.2, 1.4, Therapist:  SUSY Domingo     Case Management    ([id number]) Spoke with Leo Horton for case management. Discussed treatment plan update. Obtained signature, declined copy. Reported no weekend plans \"as of yet\". Leo Horton is aware of next scheduled 1:1 meeting.     Current suicide risk : Low      Medications changes/added/denied? 7     Treatment session number: 7     Individual Case Management Visit provided today? Yes      [facility] follow up physician's orders: Continue to follow orders.    "

## 2024-02-16 NOTE — PSYCH
Subjective:     Patient ID: Leo Horton 33 y.o.  Male    [facility] Clinical Progress Notes      Specialized Services Documentation  Therapist must complete separate progress note for each specific clinical activity in which the individual participated during the day.     Tx Plan Objective: 1.1, 1.2, 1.4, Therapist:  SUSY Domingo     Case Management Note    SUSY Domingo     Current suicide risk : Low     A case management session is not scheduled today with Leo Horton ; additionally, they did not request a CM meeting.  Leo Horton is aware of next scheduled 1:1.    Medications changes/added/denied? No     Treatment session number: 8    Individual Case Management Visit provided today? No    [facility] follow up physician's orders: None at this time

## 2024-02-19 ENCOUNTER — OFFICE VISIT (OUTPATIENT)
Dept: PSYCHOLOGY | Facility: CLINIC | Age: 34
End: 2024-02-19
Payer: COMMERCIAL

## 2024-02-19 DIAGNOSIS — F31.81 BIPOLAR II DISORDER (HCC): Primary | ICD-10-CM

## 2024-02-19 PROCEDURE — S0201 PARTIAL HOSPITALIZATION SERV: HCPCS

## 2024-02-19 PROCEDURE — G0176 OPPS/PHP;ACTIVITY THERAPY: HCPCS

## 2024-02-19 PROCEDURE — G0410 GRP PSYCH PARTIAL HOSP 45-50: HCPCS

## 2024-02-19 PROCEDURE — G0177 OPPS/PHP; TRAIN & EDUC SERV: HCPCS

## 2024-02-19 NOTE — PSYCH
Subjective:     Patient ID: Leo Horton is a 33 y.o. male.    [facility] Clinical Progress Notes      Specialized Services Documentation  Therapist must complete separate progress note for each specific clinical activity in which the individual participated during the day.     Psychotherapeutic Group ([id number])    Today's group was about personalities and how to better understand who we are (our characteristics). They learned about the different Terrell-Braga personality types and assessed themselves to gain further understanding of who they are. The group identified their personality types and reviewed ways this knowledge could be used to promote positive change, self-reflection, and a more comfortable life. These goals were achieved through multiple discussions, an MBTI done by all of the clients, and review of MBTI related materials. Individuals were then  into smaller groups and provided guiding questions to explore the provided materials to gain insights about themselves and each other. Leo Horton actively participated by completing his MBTI, discussing results with this writer and all of his peers, supporting peers, and asking clarifying questions. Continue psychotherapy groups to encourage further exploration of needs, personal awareness, and skills.     Treatment Objectives: 1.1, 1.2, 1.4 Therapist: Leo Henderson MS

## 2024-02-19 NOTE — PSYCH
Subjective:     Patient ID: Leo Horton 33 y.o. female      [facility] Clinical Progress Notes      Specialized Services Documentation  Therapist must complete separate progress note for each specific clinical activity in which the individual participated during the day.      Education Therapy         [id number] Bennett engaged throughout the treatment day. Was engaged in learning related to Illness, Medication, Aftercare and Wellness Tools. Staff utilized Verbal, Written, A/V and Demonstration teaching methods. Leo TURNER Sol shared area of learning and set a goal for outside of program to work on work stuff.

## 2024-02-19 NOTE — PSYCH
Subjective:    Patient ID: Leo Horton is a 33 y.o. male      [facility] Clinical Progress Notes      Specialized Services Documentation  Therapist must complete separate progress note for each specific clinical activity in which the individual participated during the day.       Allied Therapy ([id number]) Leo Horton verbally engaged and interacted in today's skills group focused on creating goals using the S.M.A.R.T. method. The acronym stands for S-specific, M-measurable, A-achievable, R-relevant, and T-time bound. The group discussed examples of each letter before writing down their own SMART goals related to personal, educational, and social goals. Bennett demonstrated good insight regarding how he can use the SMART method by sharing his goal for outside of program.  Bennett suggested that this group should be reviewed weekly as it helped him set more concrete goals. Positive progress made towards treatment plan. Continue to involve in AT to practice writing realistic and measurable goals to offer a sense of direction and control. Tx Plan Objective: 1.1, 1.2, 1.3, 1.4, Therapist:  BRIANNA Stevens    Education Therapy   [id number] Leo Horton actively shared in check in and goal review. Presented as Receptive related to readiness to learn.  Leo Horton  did complete goal from last treatment day identifying gaining hope, education, advocacy, responsibility, and support. did not present with any barriers to learning.     Tx Plan Objective: 1.1, 1.2, 1.4, Therapist:  BRIANNA Stevens

## 2024-02-19 NOTE — PSYCH
[facility] Discharge Instructions:     Disposition: home  Address: [address] [facility] [address].   Diagnosis:    Diagnosis ICD-10-CM Associated Orders   1. Bipolar II disorder (HCC)  F31.81         Allergies (Drug/Food): No Known Allergies    Activity: You may return to work upon DC from [facility]   Diet:no recommendations  Smoking Cessation:not a smoker   Diagnostic/Laboratory Orders: N/A  Vaccines: If you received a vaccine, please notify your family physician on your next visit. For more information, please call [phone].     Follow-up appointments/Referrals:     Tram Agosto - MSN, PMHNP, -BC  Therapy and medication management   [facility]  [address] [address] [phone]    [facility]: [address] [place] [phone] / [phone]. - Your  was SUSY Domingo and the  is Yadira Lewis     Intake/Referral/Evaluation (Non-Emergency) *NON INSURED FOR FUNDING: [facility]: [phone], [facility]: [phone], [facility]: [phone], [place]: [phone] and [facility]: [phone].     Crisis Intervention (Emergency) County Service: [facility]: [phone], [place]: [phone], [place]: [phone], [facility]: [phone], [facility]: [phone],  [place]: [phone] and [place]: [phone]. __________________________________________________________________    [facility]     [place] Support Groups     [facility] Family Support Group   3rd Monday of the month   7-8:30 p.m.   [facility], [address] [place] [address]   Contact: [phone]   [facility] Family Support Group   4th Tuesday of the month   7 - 8:30 p.m. [address] [place] [address], [place] [address]   Contact: [phone]   [facility] Family Support Group   1st Monday of the month   7 - 8:30 p.m.   [facility], [address] [place] [address]  "[place] [address]   Contact: [phone]   [facility]   3rd Monday of the month   7 - 8:30 p.m.   [address] [facility] [place] [address]   Contact: [phone]   [facility]   1st Monday of the month   7-8:30 p.m.   [facility], [address] [place] [address]   Contact: [phone]     National Crisis Textline: text \"HOME\" to 617557  [facility] Helpline 0346 838 1185 (5-163-873-[facility]) or Text \"helpline\" to 15234  National Suicide Crisis Hotline: 988 Call or Text     I, the undersigned, have received and understand the above instructions.        Patient/Rep Signature: __________________________________       Date/Time: ______________       Physician Signature: ____________________________________      Date/Time: ______________             Signature: ________________________________       Date/Time: ______________     "

## 2024-02-19 NOTE — PSYCH
Subjective:      Patient ID:+Leo Horton 33 y.o. male     [facility] Clinical Progress Notes       Specialized Services Documentation  Therapist must complete separate progress note for each specific clinical activity in which the individual participated during the day.         Education Therapy        Time 1045 to 1145 - Leo Horton attended the psychoeducation group on Discharge Planning / Information. Group members were educated by this writer on the discharge process from [facility] - transitioning to outpatient services, choosing a therapist and psychiatrist, planning for life after discharge from [facility], concerns and feelings towards discharge, and the barriers to same. In addition, the importance of making and keeping appointments was discussed, as well as the importance of being prepared for appointments and to work with their doctors and therapists as an active team member. The group discussed the challenges and benefits of being discharged.         Leo Horton displayed understanding through engagement in the topic with the group . For Leo Horton,  GOOD  progress toward goals was noted, through their engagement in group. Continue psychotherapy to encourage self-awareness and home practice of skills to support wellness.    Treatment Plan Objective 1.1, 1.2, 1.3, 1.4 Therapist: Bee Ludwig RN

## 2024-02-19 NOTE — PSYCH
Subjective:     Patient ID: Leo Horton 33 y.o.  Male    [facility] Clinical Progress Notes      Specialized Services Documentation  Therapist must complete separate progress note for each specific clinical activity in which the individual participated during the day.     Case Management Note    SUSY Domingo     Current suicide risk : Low     A case management session is not scheduled today with [facility] ; additionally, they did not request a CM meeting.  Leo Horton is aware of next scheduled 1:1.    Medications changes/added/denied? No     Treatment session number: 9    Individual Case Management Visit provided today? No    [facility] follow up physician's orders: None at this time

## 2024-02-20 ENCOUNTER — OFFICE VISIT (OUTPATIENT)
Dept: PSYCHOLOGY | Facility: CLINIC | Age: 34
End: 2024-02-20
Payer: COMMERCIAL

## 2024-02-20 DIAGNOSIS — F31.81 BIPOLAR II DISORDER (HCC): Primary | ICD-10-CM

## 2024-02-20 PROCEDURE — G0410 GRP PSYCH PARTIAL HOSP 45-50: HCPCS

## 2024-02-20 PROCEDURE — G0177 OPPS/PHP; TRAIN & EDUC SERV: HCPCS

## 2024-02-20 PROCEDURE — S0201 PARTIAL HOSPITALIZATION SERV: HCPCS

## 2024-02-20 PROCEDURE — G0176 OPPS/PHP;ACTIVITY THERAPY: HCPCS

## 2024-02-20 NOTE — PSYCH
Subjective:     Patient ID: Leo Horton 33 y.o. male         [facility] Clinical Progress Notes      Specialized Services Documentation  Therapist must complete separate progress note for each specific clinical activity in which the individual participated during the day.      Education Therapy   [id number] Bennett engaged throughout the treatment day. Was engaged in learning related to Illness, Medication, Aftercare and Wellness Tools. Staff utilized Verbal, Written, A/V and Demonstration teaching methods. Leo TURNER Sol shared area of learning and set a goal for outside of program to work on work stuff.

## 2024-02-20 NOTE — PSYCH
Psychotherapy Group    Subjective:     Patient ID: Leo Horton 33 y.o.     This group was facilitated in a private office.  ([id number])Leo Horton actively engaged in psychoeducational group about the PLEASE skill. The skill helps to maintain and regulate emotional expression/regulation. Group discovered strategies that help them to take care of physical and emotional well being on a short term and long term basis. The group talked about understanding the purpose, and meaning of self care in regards to physical, intellectual, and emotional expression, regulation , and recognition, and how it affects themselves and others.. Teaching on the emphasis of self monitoring and self-care, who the group can go to for help was brought up as well. Group was encouraged to ask questions in an open forum at the end of group. Positive progress displayed through engagement in topic.Positive progress displayed through engagement in topic. Pt will continue to engage in psychotherapy to encourage self realization in treatment.  Treatment Plan Objective 1.1, 1.2, 1.3, 1.4 Therapist: Reji NICHOLS Ed.

## 2024-02-20 NOTE — PSYCH
Subjective:    Patient ID: Leo Horton is a 33 y.o. male      [facility] Clinical Progress Notes      Specialized Services Documentation  Therapist must complete separate progress note for each specific clinical activity in which the individual participated during the day.       Allied Therapy ([id number]) eLo Horton actively participated in life skills group focused on getting to know your anger.  opened by explaining that anger is a normal, human emotion and “getting to know your anger” and confronting it is the first step in effective anger management. Bennett engaged in self-reflection worksheet to inventory physical, behavioral, and emotional anger symptoms. Afterwards, group went over personal anger styles.  introduced “stuffing” as a passive style of coping with anger, “escalating” as an aggressive style of coping with anger, and “managing” as the most effective method in coping with anger situations. Group transitioned into discussion, explanation, and demonstration of being open, honest, and direct to process the benefits of being assertive with anger situations. Bennett identified and shared their personal anger style is aggressive but not towards others. Group discussed how they can work on managing. Positive work noted towards treatment goals. Continue to involve in psychotherapy and life skills groups to increase wellness tools to manage overwhelming emotions.  Tx Plan Objective: 1.1, 1.2, 1.4, Therapist:  BRIANNA Stevens    Education Therapy   [id number] Leo Horton actively shared in check in and goal review. Presented as Receptive related to readiness to learn.  Leo Horton  did complete goal from last treatment day identifying gaining responsibility. did not present with any barriers to learning.     Tx Plan Objective: 1.1, 1.2, 1.4, Therapist:  BRIANNA Stevens

## 2024-02-20 NOTE — PSYCH
Visit Time    Visit Start Time:  1230  Visit Stop Time: 1330  Total Visit Duration: 60 minutes    Subjective:     Patient ID: Leo Horton is a 33 y.o. y.o. male.    [facility] Clinical Progress Notes      Specialized Services Documentation  Therapist must complete separate progress note for each specific clinical activity in which the individual participated during the day.       Psychotherapy Group   Leo Hortonactively shared in psychotherapy group focused DBT Module Interpersonal Effectiveness and RACHEL skill.  Engaged in tasks exploring what makes it difficult to share and strategies to improve with supports.  Leo participated in discussion related to communication roadblocks.  He was an active participant as group worked together to practice writing out a meaningful interaction using RACHEL.  He was engaged and shared throughout the exercises and discussion.  Positive progress toward goal noted. Continue group to encourage sharing, personal advocacy and practice of wellness tools.        Tx Plan Objective: 1.2,1.4, Therapist:  Ebony HARRIS & Bee Ludwig RN

## 2024-02-21 ENCOUNTER — OFFICE VISIT (OUTPATIENT)
Dept: PSYCHOLOGY | Facility: CLINIC | Age: 34
End: 2024-02-21
Payer: COMMERCIAL

## 2024-02-21 ENCOUNTER — OFFICE VISIT (OUTPATIENT)
Dept: PSYCHIATRY | Facility: CLINIC | Age: 34
End: 2024-02-21
Payer: COMMERCIAL

## 2024-02-21 DIAGNOSIS — F31.81 BIPOLAR II DISORDER (HCC): Primary | ICD-10-CM

## 2024-02-21 PROCEDURE — 90832 PSYTX W PT 30 MINUTES: CPT

## 2024-02-21 PROCEDURE — S0201 PARTIAL HOSPITALIZATION SERV: HCPCS

## 2024-02-21 PROCEDURE — 99214 OFFICE O/P EST MOD 30 MIN: CPT | Performed by: NURSE PRACTITIONER

## 2024-02-21 PROCEDURE — G0177 OPPS/PHP; TRAIN & EDUC SERV: HCPCS

## 2024-02-21 PROCEDURE — G0410 GRP PSYCH PARTIAL HOSP 45-50: HCPCS

## 2024-02-21 PROCEDURE — G0176 OPPS/PHP;ACTIVITY THERAPY: HCPCS

## 2024-02-21 RX ORDER — QUETIAPINE FUMARATE 50 MG/1
100 TABLET, EXTENDED RELEASE ORAL
Status: SHIPPED
Start: 2024-02-21

## 2024-02-21 NOTE — PSYCH
"Assessment/Plan:      Diagnoses and all orders for this visit:     Bipolar II disorder (HCC)        [facility] Treatment Plan      AREAS OF NEED: Leo Horton is experiencing symptoms related to his diagnosis of Bipolar II as evidenced by feeling depressed, feeling like a failure, experiencing episodes of of impulsivity, not sleeping for several days, expending money on things that he does not need, pressured speech, decreased energy, anxiety, and decreased concentration due to relationship and social stressors.     Date Initiated: 02/07/24     Strengths: \"I can make people laugh\"        LONG TERM GOAL:   Date Initiated: 02/07/24  1.0 While at "[employer] [facility], I will engage in psychoeducational groups and practice skills that are aimed at improving my mental health, ability to cope and manage emotions, gain insights and skills to increase my quality of life.  Target Date: 03/06/2024  Completion Date: 02/21/2024        SHORT TERM OBJECTIVES:      Date Initiated: 02/07/24  1.1 I will practice radical acceptance by learning how to accept situations that are outside of my control without judging them, to reduce the suffering that is caused by them  Revision Date: 02/16/2024  Target Date: 02/16/2024; 2/27/2024  Completion Date: 02/21/2024     Date Initiated: 02/07/24  1.2 In order to help manage my anger, I will learn the warning signs of my anger  I will verbalize situations that may trigger feelings of depression and/or anger and practice cognitive methods to control impulsive behavior  Revision Date: 02/16/2024  Target Date: 02/16/2024; 2/27/2024  Completion Date:02/21/2024     Date Initiated: 02/07/24  1.3 I will take medications as prescribed and share questions and concerns if arise.    Revision Date:02/16/2024  Target Date: 02/16/2024; 2/27/2024  Completion Date:      Date Initiated: 02/07/24  1.4 I will identify 3 ways my supports can assist in my wellness journey and use them if/when needed.    Revision " Date:02/16/2024  Target Date: 02/16/2024; 2/27/2024  Completion Date:02/21/2024            7 DAY REVISION:  1.5: Treatment plan goals remain the same as they are relevant to my overall goal and objectives as I plan for discharge on Wednesday 2/21/2024.    Date Initiated: 02/16/2024  Revision Date: n/a routine discharge   Target Date: 2/27/2024  Completion Date: 02/21/2024        PSYCHIATRY:  Date Initiated:  02/07/24  Medication Management and Education      Revision Date: 02/16/2024      The person(s) responsible for carrying out the plan is Dr. Eleanor Suero MD & KANWAL Carcamo      NURSING/SYMPTOM EDUCATION:  Date Initiated: 02/07/24       1.1, 1.2. 1.3, 1.4 Provide wellness/symptoms and skill education groups three to five days weekly to educate Leo Horton on signs and symptoms of diagnoses, skills to manage stressors, and medication questions that will be addressed by the treatment team.        Revision date:02/16/2024       The person(s) responsible for carrying out the plan is Leo Henderson MS & Bee Ludwig RN      PSYCHOLOGY:   Date Initiated: 02/07/24       1.1, 1.2, 1.4 Provide psychotherapy group 5 times per week to allow opportunity for Leo Horton  to explore stressors and ways of coping.   Revision Date: 02/16/2024  The person(s) responsible for carrying out the plan is SUSY Chavira      ALLIED THERAPY:   Date Initiated: 02/07/24  1.1,1.2 Engage Leo Horton in AT group 5 times daily to encourage development and use of wellness tools to decrease symptoms and promote recovery through meaningful activity.  Revision Date: 02/16/2024      The person(s) responsible for carrying out the plan is KIMBERLY Kolb and BRIANNA Stevens     CASE MANAGEMENT:   Date Initiated: 02/07/24      1.0 This  will meet with Leo Horton  3-4 times weekly to assess treatment progress, discharge planning, connection to community supports and UR as  indicated.  Revision Date: 02/16/2024  The person(s) responsible for carrying out the plan is SUSY Domingo      TREATMENT REVIEW/COMMENTS: Tx. Plan was reviewed and updated with Leo Horton. Leo Horton relates understanding to his diagnosis and overall treatment plan. He is agreeable.     2/21/2024 - Tx plan successfully completed on Wed. 2/21/2024.      DISCHARGE CRITERIA: Identify 3 signs of progress and complete relapse prevention plan.    DISCHARGE PLAN: Connect with identified outpatient providers.   Estimated Length of Stay: 10 treatment days       CLIENT COMMENTS / Please share your thoughts, feelings, need and/or experiences regarding your treatment plan with Staff.  Please see follow up note with comments.        Signatures can be found on [facility] Treatment plan consent form.

## 2024-02-21 NOTE — PSYCH
Subjective:    Patient ID: Leo Horton is a 33 y.o. male      [facility] Clinical Progress Notes      Specialized Services Documentation  Therapist must complete separate progress note for each specific clinical activity in which the individual participated during the day.       Allied Therapy ([id number]) Leo Horton was engaged in skills group to explore leisure activities utilizing one's strengths and abilities. Group members were instructed to list five of their strengths and five activities that bring them enjoyment. Group members took turns sharing their lists before discussing similarities, differences, activity participation, and exploration of new activities. Group discussed benefits of leisure activities which included self-esteem boosters, building and strengthening relationships, increasing supports, and bringing more edis to our lives. Positive work noted towards treatment goals through note taking and participation in leisure exploration activity. Continue with planned discharge at the end of treatment day. Tx Plan Objective: 1.1, 1.2, 1.4, Therapist:  BRIANNA Stevens    Education Therapy   [id number] Leo Horton actively shared in check in and goal review. Presented as Receptive related to readiness to learn.  Leo Horton  did complete goal from last treatment day identifying gaining hope and responsibility. did not present with any barriers to learning. Bennett asked this writer if he could talk to someone about medication. This writer made Bennett aware that he will be seen for medication management today by Naila Carballo.    Tx Plan Objective: 1.1, 1.2, 1.3, 1.4, Therapist:  BRIANNA Stevens

## 2024-02-21 NOTE — PSYCH
"Subjective:     Patient ID: Leo Horton is a 33 y.o. male.    [facility] Clinical Progress Notes      Specialized Services Documentation  Therapist must complete separate progress note for each specific clinical activity in which the individual participated during the day.     Psychotherapeutic Group ([id number])    The group learned about all 15 cognitive distortions. They gained a further understanding about the way they think irrationally and discovered ways to address those instincts. We went over a lot of information on distortions; examples, definitions, consequences, and ways to identify/change cognitive distortions when they occur. The group was provided a worksheet with all 15 cognitive distortions and their definitions. Each cognitive distortion was explained/analyzed by the group and members provided examples for each of the 15 distortions. The group was also provided an article detailing 7 CBT solutions to cognitive distortions and a CBT worksheet for identifying when someone is thinking with distortion. Additionally, each member was given a list (w/ descriptions) of \"Ten ways to untwist your thinking\", which described 10 different ways to identify and reframe cognitive distortions. Leo Horton was an active participant in this group via sharing and asking clarifying questions. He also participated in the activity. Leo is actively working on goals. Continue psychotherapy groups to encourage further exploration of needs, personal awareness, and skills.    Tx Plan Objective: 1.1,1.2, 1.4   Therapist: Leo Henderson MS    "

## 2024-02-21 NOTE — PSYCH
[facility] Clinical Progress Notes      Specialized Services Documentation  Therapist must complete separate progress note for each specific clinical activity in which the individual participated during the day.       [facility] follow up physician's orders:   Date: 2/21/2024  Time: 11:16  DISCHARGE TODAY  Eleanor Suero MD

## 2024-02-21 NOTE — PSYCH
Subjective:      Patient ID:Yoselin Horton 33 y.o. male     [facility] Clinical Progress Notes       Specialized Services Documentation  Therapist must complete separate progress note for each specific clinical activity in which the individual participated during the day.         Education Therapy      Time: 1045 to 1145 - Leo Horton was actively engaged in the psychoeducational group which focused on the benefits of Exercise on Mental Health.  A video on Exercise and Mental Health was viewed and discussed. Group was identified and actively shared the benefits of exercise to support mental wellness. Group explored healthy exercises, barriers to exercise, strategies to overcome the barriers to exercise. A tip sheet and exercise diary were provided to each group member. Group was encouraged to ask questions in an open forum during the group.   Leo Horton displayed understanding through engagement in the topic with the group . For Leo Horton ,  GOOD  progress toward goals was noted, through their engagement in group. Continue psychotherapy to encourage self-awareness and home practice of skills to support wellness.    Treatment Plan Objective 1.1, 1.2, 1.3, 1.4 Therapist: Bee Ludwig RN

## 2024-02-21 NOTE — PSYCH
Subjective:     Patient ID: Leo TURNER Sol 33 y.o. male         [facility] Clinical Progress Notes      Specialized Services Documentation  Therapist must complete separate progress note for each specific clinical activity in which the individual participated during the day.      Education Therapy     [id number] Bennett engaged throughout the treatment day. Was engaged in learning related to Illness, Medication, Aftercare and Wellness Tools. Staff utilized Verbal, Written, A/V and Demonstration teaching methods. Shiraz Check shared area of learning and set a goal for outside of program to get ready to go back to work

## 2024-02-21 NOTE — PSYCH
PHP MEDICATION MANAGEMENT NOTE        [facility]    Name and Date of Birth:  Leo Madden y.o. 1990 MRN: [id number]    Date of Visit: February 21, 2024    Visit Time    Visit Start Time: 1000   Visit Stop Time: 1030  Total Visit Duration:  30 minutes     The total visit duration detailed above includes: patient engagement, medication management, psychotherapy/counseling, discussion regarding treatment goals, and coordination of care.      Note Share Disclaimer:     This note was not shared with the patient due to reasonable likelihood of causing patient harm    No Known Allergies  SUBJECTIVE:    Leo is seen today for a follow up for Bipolar Disorder. He continues to improve gradually since beginning PHP.  He presents superficially bright and pleasant.  Does state he is feeling improved mood wise.  Does tend to minimize his symptoms.  He tells me since our last visit he saw his outpatient provider who increased his Seroquel XR to 100 mg.  States he takes it earlier, but still feels tired in the morning.  He will follow-up with his outpatient provider, he would like to continue the same for now and move the dosing to 5 PM.    He denies any side effects from current psychiatric medications.  Other than tiredness noted above    PLAN:  Continue Seroquel  mg but take at 5 PM  Aware of 24 hour and weekend coverage for urgent situations accessed by calling [facility] main practice number  Continue partial hospitalization program    Diagnoses and all orders for this visit:    Bipolar II disorder (HCC)    Other orders  -     QUEtiapine (SEROquel XR) 50 mg; Take 2 tablets (100 mg total) by mouth daily at bedtime        Current Outpatient Medications on File Prior to Visit   Medication Sig Dispense Refill    [DISCONTINUED] QUEtiapine (SEROquel XR) 50 mg Take 50 mg by mouth daily at bedtime       No current facility-administered  medications on file prior to visit.       HPI ROS Appetite Changes and Sleep:     He reports fluctuating sleep pattern, fluctuating appetite, fluctuating energy levels. Denies homicidal ideation, denies suicidal ideation    Review Of Systems:   no complaints, all other systems are negative         Mental Status Evaluation:    Appearance:  age appropriate   Behavior:  pleasant, cooperative   Speech:  normal rate, normal volume   Mood:  improved   Affect:  normal range and intensity   Thought Process:  goal directed   Associations: intact associations   Thought Content:  no overt delusions   Perceptual Disturbances: none   Risk Potential: Suicidal ideation - None  Homicidal ideation - None  Potential for aggression - No   Sensorium:  oriented to person, place, and time/date   Memory:  recent and remote memory grossly intact   Consciousness:  alert and awake   Attention: attention span and concentration are age appropriate   Insight:  age appropriate   Judgment: age appropriate   Gait/Station: normal gait/station, normal balance   Motor Activity: no abnormal movements       History Review:The following portions of the patient's history were reviewed and updated as appropriate: psychiatric history, trauma history allergies, current medications, past family history, past medical history, past social history, past surgical history, and problem list   OBJECTIVE:     Vital signs in last 24 hours:    There were no vitals filed for this visit.  Laboratory Results: I have personally reviewed all pertinent laboratory/tests results.    Medications Risks/Benefits:      Risks, Benefits And Possible Side Effects Of Medications:    Discussed risks and benefits of treatment with patient including risk of parkinsonian symptoms, metabolic syndrome, tardive dyskinesia and neuroleptic malignant syndrome related to treatment with antipsychotic medications     Controlled Medication Discussion:     Not applicable - controlled prescriptions  are not prescribed by this practice    KANWAL Carcamo 02/21/24
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
## 2024-02-21 NOTE — PSYCH
Subjective:     Patient ID: Leo Horton 33 y.o. Male    [facility] Clinical Progress Notes      Specialized Services Documentation  Therapist must complete separate progress note for each specific clinical activity in which the individual participated during the day.     Case Management    ([id number])  Leo KHOURY met with Shiraz Check for [facility] Evans due to scheduling issues. He Reviewed relapse prevention plan, aftercare plan, and medication list (copies provided). Leo TURNER Sol shared improvement through improvement in his mood. Denied SI, HI, and psychosis. Aftercare providers to receive summary. He is going to also look into [facility]. PHQ-9 went from a 17 to a 3     Current suicide risk : low     Medications changes/added/denied? No     Treatment session number: 10     Individual Case Management Visit provided today? Yes     [facility] follow up physician's orders: Proceed with discharge

## 2024-02-22 ENCOUNTER — APPOINTMENT (OUTPATIENT)
Dept: PSYCHOLOGY | Facility: CLINIC | Age: 34
End: 2024-02-22
Payer: COMMERCIAL

## 2024-02-23 ENCOUNTER — APPOINTMENT (OUTPATIENT)
Dept: PSYCHOLOGY | Facility: CLINIC | Age: 34
End: 2024-02-23
Payer: COMMERCIAL

## 2024-04-21 NOTE — PSYCH
Subjective:    Patient ID: Leo Horton is a 33 y.o. male      [facility] Clinical Progress Notes      Specialized Services Documentation  Therapist must complete separate progress note for each specific clinical activity in which the individual participated during the day.       Allied Therapy ([id number]) Leo Hortno verbally engaged and interacted in emotion regulation skills group. Group began by discussing how they currently take care of their physical needs before being introduced to the PLEASE skill. PLEASE stands for treat Physical iLlness, Eat healthy, avoid mood Altering substances, Sleep well, and Exercise. Bennett was observed engaged in self-reflection worksheet to determine how to apply the PLEASE skill to reduce vulnerability to negative emotions. Bennett identified he could improve eating healthy as he appeared surprised that food can be a contributing factor to mental health. Some positive progress noted towards treatment plan goals. Continue with group therapy to explore wellness strategies and encourage self-practice. Tx Plan Objective: 1.1, 1.2, 1.3, 1.4, Therapist:  BRIANNA Stevens    Education Therapy   [id number] Leo Horton actively shared in check in and goal review. Presented as Receptive related to readiness to learn.  Leo Horton  did complete goal from last treatment day identifying gaining responsibility. did not present with any barriers to learning.     Tx Plan Objective: 1.1, 1.2, 1.4, Therapist:  BRIANNA Stevens   Arm band on/IV intact

## 2025-06-02 ENCOUNTER — TELEPHONE (OUTPATIENT)
Dept: EMERGENCY DEPT | Facility: HOSPITAL | Age: 35
End: 2025-06-02

## 2025-06-02 DIAGNOSIS — L23.7 POISON IVY DERMATITIS: Primary | ICD-10-CM

## 2025-06-02 RX ORDER — PREDNISONE 20 MG/1
TABLET ORAL
Qty: 21 TABLET | Refills: 0 | Status: SHIPPED | OUTPATIENT
Start: 2025-06-02 | End: 2025-06-14